# Patient Record
Sex: FEMALE | Race: WHITE | NOT HISPANIC OR LATINO | Employment: FULL TIME | URBAN - METROPOLITAN AREA
[De-identification: names, ages, dates, MRNs, and addresses within clinical notes are randomized per-mention and may not be internally consistent; named-entity substitution may affect disease eponyms.]

---

## 2023-07-14 PROBLEM — I10 HYPERTENSION: Status: ACTIVE | Noted: 2023-07-14

## 2023-07-14 PROBLEM — G43.909 MIGRAINES: Status: ACTIVE | Noted: 2023-07-14

## 2023-07-14 PROBLEM — E78.5 HYPERLIPIDEMIA: Status: ACTIVE | Noted: 2023-07-14

## 2023-07-14 PROBLEM — E66.01 MORBID OBESITY (MULTI): Status: ACTIVE | Noted: 2023-07-14

## 2023-07-14 RX ORDER — LOVASTATIN 40 MG/1
2 TABLET ORAL DAILY
COMMUNITY
Start: 2019-01-30 | End: 2023-08-29 | Stop reason: SDUPTHER

## 2023-07-14 RX ORDER — LISINOPRIL AND HYDROCHLOROTHIAZIDE 20; 25 MG/1; MG/1
1 TABLET ORAL DAILY
COMMUNITY
Start: 2019-11-13 | End: 2023-08-29 | Stop reason: SDUPTHER

## 2023-07-14 RX ORDER — ELETRIPTAN HYDROBROMIDE 40 MG/1
TABLET, FILM COATED ORAL
COMMUNITY
Start: 2019-01-30 | End: 2023-08-29 | Stop reason: SDUPTHER

## 2023-07-14 RX ORDER — AMLODIPINE BESYLATE 5 MG/1
1 TABLET ORAL DAILY
COMMUNITY
Start: 2019-01-30 | End: 2023-08-29 | Stop reason: SDUPTHER

## 2023-08-07 ENCOUNTER — OFFICE VISIT (OUTPATIENT)
Dept: PRIMARY CARE | Facility: CLINIC | Age: 58
End: 2023-08-07
Payer: COMMERCIAL

## 2023-08-07 VITALS
SYSTOLIC BLOOD PRESSURE: 130 MMHG | HEART RATE: 90 BPM | HEIGHT: 64 IN | OXYGEN SATURATION: 97 % | BODY MASS INDEX: 36.88 KG/M2 | WEIGHT: 216 LBS | DIASTOLIC BLOOD PRESSURE: 80 MMHG

## 2023-08-07 DIAGNOSIS — R35.0 URINE FREQUENCY: ICD-10-CM

## 2023-08-07 DIAGNOSIS — N39.0 URINARY TRACT INFECTION WITHOUT HEMATURIA, SITE UNSPECIFIED: Primary | ICD-10-CM

## 2023-08-07 LAB
POC APPEARANCE, URINE: ABNORMAL
POC BILIRUBIN, URINE: NEGATIVE
POC BLOOD, URINE: ABNORMAL
POC COLOR, URINE: ABNORMAL
POC GLUCOSE, URINE: ABNORMAL MG/DL
POC KETONES, URINE: NEGATIVE MG/DL
POC LEUKOCYTES, URINE: ABNORMAL
POC NITRITE,URINE: POSITIVE
POC PH, URINE: 7 PH
POC PROTEIN, URINE: NEGATIVE MG/DL
POC SPECIFIC GRAVITY, URINE: 1.01
POC UROBILINOGEN, URINE: 1 EU/DL

## 2023-08-07 PROCEDURE — 87077 CULTURE AEROBIC IDENTIFY: CPT

## 2023-08-07 PROCEDURE — 81003 URINALYSIS AUTO W/O SCOPE: CPT | Performed by: FAMILY MEDICINE

## 2023-08-07 PROCEDURE — 87186 SC STD MICRODIL/AGAR DIL: CPT

## 2023-08-07 PROCEDURE — 3075F SYST BP GE 130 - 139MM HG: CPT | Performed by: FAMILY MEDICINE

## 2023-08-07 PROCEDURE — 1036F TOBACCO NON-USER: CPT | Performed by: FAMILY MEDICINE

## 2023-08-07 PROCEDURE — 87086 URINE CULTURE/COLONY COUNT: CPT

## 2023-08-07 PROCEDURE — 3079F DIAST BP 80-89 MM HG: CPT | Performed by: FAMILY MEDICINE

## 2023-08-07 PROCEDURE — 99213 OFFICE O/P EST LOW 20 MIN: CPT | Performed by: FAMILY MEDICINE

## 2023-08-07 RX ORDER — SULFAMETHOXAZOLE AND TRIMETHOPRIM 800; 160 MG/1; MG/1
1 TABLET ORAL 2 TIMES DAILY
Qty: 20 TABLET | Refills: 0 | Status: SHIPPED | OUTPATIENT
Start: 2023-08-07 | End: 2023-08-07

## 2023-08-07 NOTE — PROGRESS NOTES
I saw and evaluated the patient. I personally obtained the key and critical portions of the history and physical exam or was physically present for key and critical portions performed by the nurse practitioner student. I reviewed the documentation and discussed the patient with the nurse practitioner student.  I was directly involved with the history, exam and medical decision making and agree with the medical decision making as documented in the note.      General Medical Management Note    57 y.o. female presents for possible UTI.  HPI    Patient presents with urinary urgency and frequency,lower back pain, low grade temperature.  Symptoms began 3 days ago.  Denies burning with urination. States she has had UTIs in the past, but not in the last 3-4 years.  Urine dipped and culture sent today.     Past Medical History:   Diagnosis Date    Body mass index (BMI) 39.0-39.9, adult 08/24/2021    BMI 39.0-39.9,adult      Past Surgical History:   Procedure Laterality Date    OTHER SURGICAL HISTORY  01/30/2019    Tonsillectomy     Family History   Problem Relation Name Age of Onset    Diabetes Mother      Hypertension Mother      Coronary artery disease Father      Diabetes Father      Hyperlipidemia Father      Diabetes Maternal Grandmother      Diabetes Maternal Grandfather      Diabetes Paternal Grandmother      Diabetes Paternal Grandfather        Social History     Socioeconomic History    Marital status:      Spouse name: Not on file    Number of children: Not on file    Years of education: Not on file    Highest education level: Not on file   Occupational History    Not on file   Tobacco Use    Smoking status: Never    Smokeless tobacco: Never   Substance and Sexual Activity    Alcohol use: Never    Drug use: Never    Sexual activity: Not on file   Other Topics Concern    Not on file   Social History Narrative    Not on file     Social Determinants of Health     Financial Resource Strain: Not on file   Food  "Insecurity: Not on file   Transportation Needs: Not on file   Physical Activity: Not on file   Stress: Not on file   Social Connections: Not on file   Intimate Partner Violence: Not on file   Housing Stability: Not on file       Current Outpatient Medications on File Prior to Visit   Medication Sig Dispense Refill    amLODIPine (Norvasc) 5 mg tablet Take 1 tablet (5 mg) by mouth once daily.      eletriptan (Relpax) 40 mg tablet Take by mouth.      lisinopriL-hydrochlorothiazide 20-25 mg tablet Take 1 tablet by mouth once daily.      lovastatin (Mevacor) 40 mg tablet Take 2 tablets (80 mg) by mouth once daily.       No current facility-administered medications on file prior to visit.       No Known Allergies      ROS: Denies chest pain, SOB, Headache, GI problems     Visit Vitals  /80   Pulse 90   Ht 1.619 m (5' 3.75\")   Wt 98 kg (216 lb)   SpO2 97%   BMI 37.37 kg/m²   Smoking Status Never   BSA 2.1 m²        PHYSICAL EXAM:  Alert and oriented x3.  Neck supple without lymph adenopathy or carotid bruit.  No masses or thyromegaly  Heart regular rate and rhythm without murmur.  Lungs clear to auscultation.    DIAGNOSIS/PLAN:    1. Urine frequency  - POCT UA Automated manually resulted  - Urine Culture    2. Urinary tract infection without hematuria, site unspecified  New order for Bactrim DS, PO, BID, x 10 days.    Teaching done to increase fluid intake and to set up MyChart, so that office can easily notify patient if antibiotic needs changed once culture is resulted.     Return to office on August 29, 2023 for comprehensive medical evaluation, long-term medication use monitoring, and preventative services screening    Will continue to monitor, evaluate, assess and treat all problems/diagnoses as appropriate and continue to collaborate with specialists.    Encouraged to sign up with  [employer]    Contact office or send a  [employer] message with any questions or concerns    Patient will only be notified of labs " that require medical intervention.    Prescriptions will not be filled unless you are compliant with your follow up appointments or have a follow up appointment scheduled as per instruction of your physician. Refills should be requested at the time of your visit.    **Charting was completed using voice recognition technology and may include unintended errors**    Jori Urrutia DO, FACOFP  Senior Attending Physician  [facility]  [id number]  [place] [address] [phone]         Jori Urrutia DO, FACOFP

## 2023-08-09 LAB — URINE CULTURE: ABNORMAL

## 2023-08-28 ASSESSMENT — PROMIS GLOBAL HEALTH SCALE
RATE_SOCIAL_SATISFACTION: EXCELLENT
RATE_GENERAL_HEALTH: VERY GOOD
RATE_PHYSICAL_HEALTH: VERY GOOD
RATE_QUALITY_OF_LIFE: EXCELLENT
RATE_AVERAGE_PAIN: 0
RATE_MENTAL_HEALTH: VERY GOOD
CARRYOUT_PHYSICAL_ACTIVITIES: COMPLETELY
EMOTIONAL_PROBLEMS: RARELY
CARRYOUT_SOCIAL_ACTIVITIES: EXCELLENT

## 2023-08-29 ENCOUNTER — OFFICE VISIT (OUTPATIENT)
Dept: PRIMARY CARE | Facility: CLINIC | Age: 58
End: 2023-08-29
Payer: COMMERCIAL

## 2023-08-29 VITALS
SYSTOLIC BLOOD PRESSURE: 124 MMHG | BODY MASS INDEX: 35.85 KG/M2 | HEIGHT: 64 IN | WEIGHT: 210 LBS | DIASTOLIC BLOOD PRESSURE: 70 MMHG

## 2023-08-29 DIAGNOSIS — Z12.31 ENCOUNTER FOR SCREENING MAMMOGRAM FOR MALIGNANT NEOPLASM OF BREAST: ICD-10-CM

## 2023-08-29 DIAGNOSIS — E66.01 MORBID OBESITY (MULTI): ICD-10-CM

## 2023-08-29 DIAGNOSIS — G43.119 INTRACTABLE MIGRAINE WITH AURA WITHOUT STATUS MIGRAINOSUS: ICD-10-CM

## 2023-08-29 DIAGNOSIS — I10 PRIMARY HYPERTENSION: ICD-10-CM

## 2023-08-29 DIAGNOSIS — Z00.00 WELLNESS EXAMINATION: Primary | ICD-10-CM

## 2023-08-29 DIAGNOSIS — E78.2 MIXED HYPERLIPIDEMIA: ICD-10-CM

## 2023-08-29 DIAGNOSIS — Z23 NEED FOR IMMUNIZATION AGAINST INFLUENZA: ICD-10-CM

## 2023-08-29 LAB
ALANINE AMINOTRANSFERASE (SGPT) (U/L) IN SER/PLAS: 17 U/L (ref 7–45)
ALBUMIN (G/DL) IN SER/PLAS: 4.7 G/DL (ref 3.4–5)
ALKALINE PHOSPHATASE (U/L) IN SER/PLAS: 71 U/L (ref 33–110)
ANION GAP IN SER/PLAS: 13 MMOL/L (ref 10–20)
ASPARTATE AMINOTRANSFERASE (SGOT) (U/L) IN SER/PLAS: 20 U/L (ref 9–39)
BILIRUBIN TOTAL (MG/DL) IN SER/PLAS: 0.5 MG/DL (ref 0–1.2)
CALCIUM (MG/DL) IN SER/PLAS: 9.9 MG/DL (ref 8.6–10.3)
CARBON DIOXIDE, TOTAL (MMOL/L) IN SER/PLAS: 27 MMOL/L (ref 21–32)
CHLORIDE (MMOL/L) IN SER/PLAS: 101 MMOL/L (ref 98–107)
CHOLESTEROL (MG/DL) IN SER/PLAS: 184 MG/DL (ref 0–199)
CHOLESTEROL IN HDL (MG/DL) IN SER/PLAS: 54.1 MG/DL
CHOLESTEROL/HDL RATIO: 3.4
CREATININE (MG/DL) IN SER/PLAS: 0.7 MG/DL (ref 0.5–1.05)
GFR FEMALE: >90 ML/MIN/1.73M2
GLUCOSE (MG/DL) IN SER/PLAS: 120 MG/DL (ref 74–99)
LDL: 113 MG/DL (ref 0–99)
POTASSIUM (MMOL/L) IN SER/PLAS: 4.1 MMOL/L (ref 3.5–5.3)
PROTEIN TOTAL: 7.5 G/DL (ref 6.4–8.2)
SODIUM (MMOL/L) IN SER/PLAS: 137 MMOL/L (ref 136–145)
TRIGLYCERIDE (MG/DL) IN SER/PLAS: 84 MG/DL (ref 0–149)
UREA NITROGEN (MG/DL) IN SER/PLAS: 17 MG/DL (ref 6–23)
VLDL: 17 MG/DL (ref 0–40)

## 2023-08-29 PROCEDURE — 90686 IIV4 VACC NO PRSV 0.5 ML IM: CPT | Performed by: FAMILY MEDICINE

## 2023-08-29 PROCEDURE — 3008F BODY MASS INDEX DOCD: CPT | Performed by: FAMILY MEDICINE

## 2023-08-29 PROCEDURE — 3078F DIAST BP <80 MM HG: CPT | Performed by: FAMILY MEDICINE

## 2023-08-29 PROCEDURE — 80053 COMPREHEN METABOLIC PANEL: CPT

## 2023-08-29 PROCEDURE — 3074F SYST BP LT 130 MM HG: CPT | Performed by: FAMILY MEDICINE

## 2023-08-29 PROCEDURE — 90471 IMMUNIZATION ADMIN: CPT | Performed by: FAMILY MEDICINE

## 2023-08-29 PROCEDURE — 1036F TOBACCO NON-USER: CPT | Performed by: FAMILY MEDICINE

## 2023-08-29 PROCEDURE — 80061 LIPID PANEL: CPT

## 2023-08-29 PROCEDURE — 99396 PREV VISIT EST AGE 40-64: CPT | Performed by: FAMILY MEDICINE

## 2023-08-29 RX ORDER — LOVASTATIN 40 MG/1
80 TABLET ORAL DAILY
Qty: 90 TABLET | Refills: 3 | Status: SHIPPED | OUTPATIENT
Start: 2023-08-29 | End: 2023-08-29

## 2023-08-29 RX ORDER — ELETRIPTAN HYDROBROMIDE 40 MG/1
20 TABLET, FILM COATED ORAL ONCE AS NEEDED
Qty: 6 TABLET | Refills: 3 | Status: SHIPPED | OUTPATIENT
Start: 2023-08-29 | End: 2023-08-29

## 2023-08-29 RX ORDER — CETIRIZINE HYDROCHLORIDE 10 MG/1
10 TABLET ORAL DAILY
COMMUNITY

## 2023-08-29 RX ORDER — AMLODIPINE BESYLATE 5 MG/1
5 TABLET ORAL DAILY
Qty: 90 TABLET | Refills: 3 | Status: SHIPPED | OUTPATIENT
Start: 2023-08-29 | End: 2023-08-29

## 2023-08-29 RX ORDER — LISINOPRIL AND HYDROCHLOROTHIAZIDE 20; 25 MG/1; MG/1
1 TABLET ORAL DAILY
Qty: 90 TABLET | Refills: 3 | Status: SHIPPED | OUTPATIENT
Start: 2023-08-29 | End: 2023-08-29

## 2023-08-29 ASSESSMENT — PATIENT HEALTH QUESTIONNAIRE - PHQ9
SUM OF ALL RESPONSES TO PHQ9 QUESTIONS 1 AND 2: 0
2. FEELING DOWN, DEPRESSED OR HOPELESS: NOT AT ALL
1. LITTLE INTEREST OR PLEASURE IN DOING THINGS: NOT AT ALL

## 2023-08-29 NOTE — ASSESSMENT & PLAN NOTE
- at goal  - continue weight loss efforts and regular exercise  - continue current medication therapy  - follow up 6 mo

## 2023-08-29 NOTE — ASSESSMENT & PLAN NOTE
Well woman visit  - continue healthy diet and exercise habits- work towards weight loss, applauded successes thus far  - Mammogram ordered today  - Last PAP/HPV testing 2020, due 2025 hx abnormal PAP in 1980s with cryotherapy, normal PAP testing since  - Cologuard 9/2021, wnl, due 9/2024  - Tdap 8/26/2022  - fasting labs ordered  - Influenza vaccination given in office  - follow up annually

## 2023-08-29 NOTE — PROGRESS NOTES
"Chief complaint:   Chief Complaint   Patient presents with    Annual Exam     CPE       HPI:  Debbi Brown is a 57 y.o. female who presents for a physical    Exercise: walk, bike, gym 4 x weekly  Is doing weight watchers- lost 20 lbs in the past few months  Caffeine: soda 1 daily  Tobacco: none  Drugs: none  Alcohol: 1 x monthly    ROS:  Constitutional:  Denies fevers, chills, night sweats  HEENT: Denies change in vision, change in hearing, sore throat, rhinorrhea, congestion  Cardiovascular: Denies chest pain, SOB, racing heart, slow heart rate, palpitations, leg edema  Pulmonary: Denies cough, wheezing, SOB  Gastrointestinal: Denies abdominal pain, diarrhea, constipation, nausea, vomiting, heartburn  Genitourinary: Admits to abnormal vaginal bleeding Denies dysuria, hematuria, incontinence, abnormal vaginal discharge, sexual dysfunction  Integumentary: Denies rash, new or changed skin lesions  Neuro: Denies headache, numbness, tingling  Musculoskeletal: Denies myalgias, arthralgias, back pain  Psych: denies change in mood, sleeping difficulties  Heme: denies bruising or bleeding     Physical exam:  /70   Ht 1.619 m (5' 3.75\")   Wt 95.3 kg (210 lb)   BMI 36.33 kg/m²   General: NAD, well appearing female  Head: normocephalic  Ears: EAC patent, TM normal bilaterally  Eyes: EOM intact, PERRLA  Nose: moist  Mouth: moist, good dentition  Heart: RRR, no murmur appreciated  Lungs: CTAB, no wheezes, rales, rhonchi  Abdomen: soft, non tender, no organomegaly  Psych: mood and affect congruent, alert and oriented  MSK: +5/5 gross strength  Neuro: +2/4 patellar and biceps reflexes, sensation grossly intact    Assessment/Plan   Problem List Items Addressed This Visit       Hyperlipidemia     - continue Lovastatin  - fasting labs ordered         Relevant Medications    lovastatin (Mevacor) 40 mg tablet    Hypertension     - at goal  - continue weight loss efforts and regular exercise  - continue current medication " therapy  - follow up 6 mo         Relevant Medications    lisinopriL-hydrochlorothiazide 20-25 mg tablet    amLODIPine (Norvasc) 5 mg tablet    Migraines     - refilled Relpax for PRN use (on average monthly)         Relevant Medications    eletriptan (Relpax) 40 mg tablet    Morbid obesity (CMS/HCC)    Wellness examination - Primary     Well woman visit  - continue healthy diet and exercise habits- work towards weight loss, applauded successes thus far  - Mammogram ordered today  - Last PAP/HPV testing 2020, due 2025 hx abnormal PAP in 1980s with cryotherapy, normal PAP testing since  - Cologuard 9/2021, wnl, due 9/2024  - Tdap 8/26/2022  - fasting labs ordered  - Influenza vaccination given in office  - follow up annually         Relevant Orders    Comprehensive Metabolic Panel    Lipid Panel     Other Visit Diagnoses       Encounter for screening mammogram for malignant neoplasm of breast        Relevant Orders    BI mammo bilateral screening tomosynthesis    Need for immunization against influenza        Relevant Orders    Flu vaccine (IIV4) age 3 years and greater, preservative free (Completed)    BMI 36.0-36.9,adult                Mckenna Santoyo, DO

## 2023-10-06 ENCOUNTER — TELEMEDICINE (OUTPATIENT)
Dept: PRIMARY CARE | Facility: CLINIC | Age: 58
End: 2023-10-06
Payer: COMMERCIAL

## 2023-10-06 ENCOUNTER — PHARMACY VISIT (OUTPATIENT)
Dept: PHARMACY | Facility: CLINIC | Age: 58
End: 2023-10-06
Payer: COMMERCIAL

## 2023-10-06 DIAGNOSIS — J01.90 ACUTE SINUSITIS, RECURRENCE NOT SPECIFIED, UNSPECIFIED LOCATION: Primary | ICD-10-CM

## 2023-10-06 PROCEDURE — 99213 OFFICE O/P EST LOW 20 MIN: CPT | Performed by: FAMILY MEDICINE

## 2023-10-06 PROCEDURE — RXMED WILLOW AMBULATORY MEDICATION CHARGE

## 2023-10-06 RX ORDER — AMOXICILLIN AND CLAVULANATE POTASSIUM 875; 125 MG/1; MG/1
875 TABLET, FILM COATED ORAL 2 TIMES DAILY
Qty: 20 TABLET | Refills: 0 | Status: SHIPPED | OUTPATIENT
Start: 2023-10-06 | End: 2023-10-16

## 2023-10-06 ASSESSMENT — ENCOUNTER SYMPTOMS
MYALGIAS: 0
HEMOPTYSIS: 0
RHINORRHEA: 1
CHILLS: 0
HEARTBURN: 0
SWEATS: 0
SHORTNESS OF BREATH: 0
FEVER: 0
COUGH: 1
HEADACHES: 1
SORE THROAT: 0
WHEEZING: 0
WEIGHT LOSS: 0

## 2023-10-06 NOTE — PROGRESS NOTES
Subjective   Patient ID: [id number]   Virtual or Telephone Consent    An interactive audio and video telecommunication system which permits real time communications between the patient (at the originating site) and provider (at the distant site) was utilized to provide this telehealth service.   Verbal consent was requested and obtained from Debbi Brown on this date, 10/06/23 for a telehealth visit.     Debbi Brown is a 57 y.o. female who presents for URI symptoms.  Cough  This is a recurrent problem. The current episode started in the past 7 days. The problem has been gradually worsening. The problem occurs every few minutes. The cough is Productive of sputum. Associated symptoms include headaches, nasal congestion, postnasal drip and rhinorrhea. Pertinent negatives include no chest pain, chills, ear congestion, ear pain, fever, heartburn, hemoptysis, myalgias, rash, sore throat, shortness of breath, sweats, weight loss or wheezing. The symptoms are aggravated by lying down.     Has had postnasal drip for about a month.  Has increased in thickness and had more congestion in the last few days.  She takes zyrtec daily.  No sore throat, fever or SOB.      No nausea or diarrhea.      Objective     There were no vitals taken for this visit.     Physical Exam  Constitutional:       General: She is not in acute distress.     Appearance: She is not ill-appearing or toxic-appearing.   Pulmonary:      Effort: Pulmonary effort is normal. No respiratory distress.   Neurological:      Mental Status: She is alert.         Assessment/Plan   Problem List Items Addressed This Visit    None  Visit Diagnoses       Acute sinusitis, recurrence not specified, unspecified location    -  Primary    Relevant Medications    amoxicillin-pot clavulanate (Augmentin) 875-125 mg tablet          I will prescribe antibiotics.  I recommend that you get a covid test and quarantine for five days if it is positive.  Return in one or two weeks  if this persists.    Frank Woo, DO

## 2023-10-06 NOTE — PATIENT INSTRUCTIONS
I will prescribe antibiotics.  I recommend that you get a covid test and quarantine for five days if it is positive.  Return in one or two weeks if this persists.

## 2023-11-07 ENCOUNTER — PHARMACY VISIT (OUTPATIENT)
Dept: PHARMACY | Facility: CLINIC | Age: 58
End: 2023-11-07
Payer: COMMERCIAL

## 2023-11-07 PROCEDURE — RXMED WILLOW AMBULATORY MEDICATION CHARGE

## 2023-11-28 ENCOUNTER — PHARMACY VISIT (OUTPATIENT)
Dept: PHARMACY | Facility: CLINIC | Age: 58
End: 2023-11-28
Payer: COMMERCIAL

## 2023-11-28 ENCOUNTER — OFFICE VISIT (OUTPATIENT)
Dept: PRIMARY CARE | Facility: CLINIC | Age: 58
End: 2023-11-28
Payer: COMMERCIAL

## 2023-11-28 VITALS
HEIGHT: 64 IN | WEIGHT: 204 LBS | BODY MASS INDEX: 34.83 KG/M2 | DIASTOLIC BLOOD PRESSURE: 78 MMHG | SYSTOLIC BLOOD PRESSURE: 124 MMHG

## 2023-11-28 DIAGNOSIS — E66.01 MORBID OBESITY (MULTI): ICD-10-CM

## 2023-11-28 DIAGNOSIS — G43.119 INTRACTABLE MIGRAINE WITH AURA WITHOUT STATUS MIGRAINOSUS: ICD-10-CM

## 2023-11-28 DIAGNOSIS — I10 PRIMARY HYPERTENSION: Primary | ICD-10-CM

## 2023-11-28 DIAGNOSIS — R73.9 HYPERGLYCEMIA: ICD-10-CM

## 2023-11-28 DIAGNOSIS — E78.2 MIXED HYPERLIPIDEMIA: ICD-10-CM

## 2023-11-28 LAB
POC FINGERSTICK BLOOD GLUCOSE: 122 MG/DL (ref 70–100)
POC HEMOGLOBIN A1C: 5.9 % (ref 4.2–6.5)

## 2023-11-28 PROCEDURE — RXMED WILLOW AMBULATORY MEDICATION CHARGE

## 2023-11-28 PROCEDURE — 3008F BODY MASS INDEX DOCD: CPT | Performed by: FAMILY MEDICINE

## 2023-11-28 PROCEDURE — 82962 GLUCOSE BLOOD TEST: CPT | Performed by: FAMILY MEDICINE

## 2023-11-28 PROCEDURE — 3074F SYST BP LT 130 MM HG: CPT | Performed by: FAMILY MEDICINE

## 2023-11-28 PROCEDURE — 99214 OFFICE O/P EST MOD 30 MIN: CPT | Performed by: FAMILY MEDICINE

## 2023-11-28 PROCEDURE — 1036F TOBACCO NON-USER: CPT | Performed by: FAMILY MEDICINE

## 2023-11-28 PROCEDURE — 83036 HEMOGLOBIN GLYCOSYLATED A1C: CPT | Performed by: FAMILY MEDICINE

## 2023-11-28 PROCEDURE — 3078F DIAST BP <80 MM HG: CPT | Performed by: FAMILY MEDICINE

## 2023-11-28 RX ORDER — LISINOPRIL AND HYDROCHLOROTHIAZIDE 20; 25 MG/1; MG/1
1 TABLET ORAL DAILY
Qty: 90 TABLET | Refills: 3 | Status: SHIPPED | OUTPATIENT
Start: 2023-11-28 | End: 2024-11-27

## 2023-11-28 RX ORDER — ELETRIPTAN HYDROBROMIDE 40 MG/1
TABLET, FILM COATED ORAL
Qty: 6 TABLET | Refills: 3 | Status: SHIPPED | OUTPATIENT
Start: 2023-11-28 | End: 2024-11-27

## 2023-11-28 RX ORDER — AMLODIPINE BESYLATE 5 MG/1
5 TABLET ORAL
Qty: 90 TABLET | Refills: 3 | Status: SHIPPED | OUTPATIENT
Start: 2023-11-28 | End: 2024-11-27

## 2023-11-28 RX ORDER — LOVASTATIN 40 MG/1
80 TABLET ORAL DAILY
Qty: 90 TABLET | Refills: 3 | Status: SHIPPED | OUTPATIENT
Start: 2023-11-28 | End: 2024-11-27

## 2023-11-28 ASSESSMENT — PATIENT HEALTH QUESTIONNAIRE - PHQ9
2. FEELING DOWN, DEPRESSED OR HOPELESS: NOT AT ALL
1. LITTLE INTEREST OR PLEASURE IN DOING THINGS: NOT AT ALL
SUM OF ALL RESPONSES TO PHQ9 QUESTIONS 1 AND 2: 0

## 2023-11-28 NOTE — PROGRESS NOTES
"Chief complaint:   Chief Complaint   Patient presents with    Follow-up     3 month follow up       HPI:  Debbi Brown is a 57 y.o. female who presents for evaluation of elevated glucose. She has lost 25 lbs in the past 6 mo or so.    Physical exam:  /78   Ht 1.619 m (5' 3.75\")   Wt 92.5 kg (204 lb)   BMI 35.29 kg/m²   General: NAD, well appearing female  Heart: RRR, no mumur appreciated  Lungs: CTAB, no wheezes, rales, rhonchi  Abdomen: soft, non tender, normoactive BS, no organomegaly  Extremities: No LE edema    Assessment/Plan   Problem List Items Addressed This Visit       Hyperlipidemia     - continue Lovastatin  - last lipid: 8/2023         Relevant Medications    lovastatin (Mevacor) 40 mg tablet    Hypertension - Primary     - at goal  - continue weight loss efforts and regular exercise  - continue current medication therapy  - follow up 6 mo         Relevant Medications    lisinopriL-hydrochlorothiazide 20-25 mg tablet    amLODIPine (Norvasc) 5 mg tablet    Migraines    Relevant Medications    eletriptan (Relpax) 40 mg tablet    Morbid obesity (CMS/HCC)     - applauded weight loss successes and encouraged continued weight loss through healthy diet and exercise         Hyperglycemia     - IO glucose 122  - IO Hba1C 5.9%  - continue weight loss efforts  - follow up 3 mo         Relevant Orders    POCT Fingerstick Glucose manually resulted (Completed)    POCT glycosylated hemoglobin (Hb A1C) manually resulted (Completed)     Other Visit Diagnoses       BMI 35.0-35.9,adult                Mckenna Santoyo, DO        "

## 2023-11-28 NOTE — ASSESSMENT & PLAN NOTE
- applauded weight loss successes and encouraged continued weight loss through healthy diet and exercise

## 2024-02-02 PROCEDURE — RXMED WILLOW AMBULATORY MEDICATION CHARGE

## 2024-02-06 ENCOUNTER — PHARMACY VISIT (OUTPATIENT)
Dept: PHARMACY | Facility: CLINIC | Age: 59
End: 2024-02-06
Payer: COMMERCIAL

## 2024-02-27 ENCOUNTER — OFFICE VISIT (OUTPATIENT)
Dept: PRIMARY CARE | Facility: CLINIC | Age: 59
End: 2024-02-27
Payer: COMMERCIAL

## 2024-02-27 VITALS
BODY MASS INDEX: 35.47 KG/M2 | WEIGHT: 205.03 LBS | DIASTOLIC BLOOD PRESSURE: 76 MMHG | TEMPERATURE: 96.9 F | SYSTOLIC BLOOD PRESSURE: 124 MMHG

## 2024-02-27 DIAGNOSIS — R73.9 HYPERGLYCEMIA: ICD-10-CM

## 2024-02-27 DIAGNOSIS — E66.01 MORBID OBESITY (MULTI): ICD-10-CM

## 2024-02-27 DIAGNOSIS — Z12.11 SCREENING FOR COLON CANCER: ICD-10-CM

## 2024-02-27 DIAGNOSIS — G43.109 MIGRAINE WITH AURA AND WITHOUT STATUS MIGRAINOSUS, NOT INTRACTABLE: Primary | ICD-10-CM

## 2024-02-27 DIAGNOSIS — E78.2 MIXED HYPERLIPIDEMIA: ICD-10-CM

## 2024-02-27 DIAGNOSIS — I10 PRIMARY HYPERTENSION: ICD-10-CM

## 2024-02-27 LAB
POC FINGERSTICK BLOOD GLUCOSE: 138 MG/DL (ref 70–100)
POC HEMOGLOBIN A1C: 6.2 % (ref 4.2–6.5)

## 2024-02-27 PROCEDURE — 3078F DIAST BP <80 MM HG: CPT | Performed by: FAMILY MEDICINE

## 2024-02-27 PROCEDURE — 3008F BODY MASS INDEX DOCD: CPT | Performed by: FAMILY MEDICINE

## 2024-02-27 PROCEDURE — 99213 OFFICE O/P EST LOW 20 MIN: CPT | Performed by: FAMILY MEDICINE

## 2024-02-27 PROCEDURE — 83036 HEMOGLOBIN GLYCOSYLATED A1C: CPT | Performed by: FAMILY MEDICINE

## 2024-02-27 PROCEDURE — 3074F SYST BP LT 130 MM HG: CPT | Performed by: FAMILY MEDICINE

## 2024-02-27 PROCEDURE — 1036F TOBACCO NON-USER: CPT | Performed by: FAMILY MEDICINE

## 2024-02-27 PROCEDURE — 82962 GLUCOSE BLOOD TEST: CPT | Performed by: FAMILY MEDICINE

## 2024-02-27 NOTE — ASSESSMENT & PLAN NOTE
- IO glucose 138  - IO Hba1C 6.2% which has increased from 5.9% last visit  - continue weight loss efforts  - dietician referral offered and placed  - follow up 3 mo

## 2024-02-27 NOTE — PROGRESS NOTES
Chief complaint:   Chief Complaint   Patient presents with    Follow-up     6 month follow up       HPI:  Debbi Brown is a 58 y.o. female who presents for evaluation of her blood pressure and blood sugar. She has been working on improving diet and exercise.    Exercise: gym, walk, 10,000-12,000 steps daily  Alcohol: less then weekly    Physical exam:  /76   Temp 36.1 °C (96.9 °F)   Wt 93 kg (205 lb 0.4 oz)   BMI 35.47 kg/m²   General: NAD, well appearing female  Heart: RRR, no mumur appreciated  Lungs: CTAB, no wheezes, rales, rhonchi  Abdomen: soft, non tender, normoactive BS, no organomegaly  Extremities: No LE edema    Assessment/Plan   Problem List Items Addressed This Visit       Hyperlipidemia     - continue Lovastatin  - last lipid: 8/2023         Hypertension     - at goal  - continue weight loss efforts and regular exercise  - continue current medication therapy  - follow up 6 mo         Migraines - Primary     - continue Relpax for PRN use (on average monthly)         Morbid obesity (CMS/HCC)     - encourage continued weight loss through healthy diet and exercise         Relevant Orders    Referral to Nutrition Services    Hyperglycemia     - IO glucose 138  - IO Hba1C 6.2% which has increased from 5.9% last visit  - continue weight loss efforts  - dietician referral offered and placed  - follow up 3 mo         Relevant Orders    POCT glycosylated hemoglobin (Hb A1C) manually resulted (Completed)    POCT Fingerstick Glucose manually resulted (Completed)    Referral to Nutrition Services     Other Visit Diagnoses       Screening for colon cancer        Relevant Orders    Colonoscopy Screening; Average Risk Patient            Mckenna Santoyo DO

## 2024-03-25 ENCOUNTER — NUTRITION (OUTPATIENT)
Dept: NUTRITION | Facility: CLINIC | Age: 59
End: 2024-03-25
Payer: COMMERCIAL

## 2024-03-25 DIAGNOSIS — E66.01 MORBID OBESITY (MULTI): ICD-10-CM

## 2024-03-25 DIAGNOSIS — Z71.3 DIETARY COUNSELING: Primary | ICD-10-CM

## 2024-03-25 DIAGNOSIS — R73.9 HYPERGLYCEMIA: ICD-10-CM

## 2024-03-25 PROCEDURE — 97802 MEDICAL NUTRITION INDIV IN: CPT | Performed by: DIETITIAN, REGISTERED

## 2024-03-25 NOTE — PROGRESS NOTES
Reason for Nutrition Visit:  Pt is a 58 y.o. female being seen for initial apt at [facility] referred for   1. Hyperglycemia  Referral to Nutrition Services      2. Morbid obesity (CMS/HCC)  Referral to Nutrition Services         Past Medical Hx:  Patient Active Problem List   Diagnosis    Hyperlipidemia    Hypertension    Migraines    Morbid obesity (CMS/HCC)    Wellness examination    Hyperglycemia      Lab Results   Component Value Date    HGBA1C 6.2 02/27/2024    CHOL 184 08/29/2023    TRIG 84 08/29/2023    HDL 54.1 08/29/2023      Food and Nutrition Hx:  Pt says her HgA1c has been elevated and actually increased in the last few months, despite losing weight intentionally. She mentions a family hx of DM. She started WW in June, but hit a plateau a few weeks ago, however, she has lost some weight since then. She also works out 5-6 days per week and gets almost 12,000 steps daily. She's been trying to avoid snacking at night or might have pickles if she has a salty craving.    24 Diet Recall:  Meal 1: 1-2 scrambled eggs and fruit  Meal 2: chicken quasidellia with low carb tortillas  Meal 3: food truck--lobster tacos  Beverages: 16-24 oz water, 1 can of coke zero, rarely etoh, lemonade made with stevia occasionally    Allergies: None  Intolerance: None  Appetite: Good  Intake: >75%  GI Symptoms : None   Swallowing Difficulty: No problems with swallowing  Dentition : own    Types of Activities: Gym Membership and does resistance machines, rowing and walks her dog or hits about 12,000 steps daily  Duration: 30-60 minutes and 1-2 hours  4-5 times per week    Sleep duration/quality : 5-6 hours, 7+ hours, and disrupted sleep  Sleep disorders: difficulty falling asleep    Supplements: Probiotic and Biotin and Emergen-C  daily    Feelings of Hunger?: Yes and will eat  Physical Feeling: Physically full  Binging: Never  Cravings: Salty  Energy Levels: Stable    Food Preparation: Patient  Cooking Skills/Barriers: None  reported  Grocery Shopping: Patient    Eating Out Type: Dinner and Restaurant  <1 times per week  Convenience Foods: Frozen Meals  rarely at work    Nutrition Focused Physical Exam:    Performed/Deferred: Deferred as pt visually appears well-nourished with no signs of malnutrition    Muscle Wasting:  Temporal: None  Shoulder: None  None  Interosseous Muscle: None  Quadriceps: None    Loss of Subcutaneous Fat:  Eyes: None  Perioral: None  Triceps: None  Chest: None    Other Physical Findings:  Hair: None  None  Mouth: None  Skin: None  Nails: None  none    Malnutrition Present: No    Estimated Energy Needs:    Weight Loss Needs: 1807 kcal/day, MSJ: 1506-1.2, 93 g/pro/day, and 1 g/pro/kg/day    Nutrition Diagnosis:    Diagnosis Statement 1:  Diagnosis Status: New  Diagnosis : Altered nutrition related lab values  related to  endocrine dysfunction  as evidenced by  HgA1c of 6.2% on 02/27/2024.     Diagnosis Statement 2:  Diagnosis Status: New  Diagnosis : Inadequate fiber intake  related to food and nutrition related knowledge deficit concerning desirable quantities of fiber as evidenced by estimated intake of fiber that is insufficient when compared to recommended amounts (38 g/day for men and 25 g/day for women)    Diagnosis Statement 3:   Diagnosis Status: New  Diagnosis : Food and nutrition related knowledge deficit related to lack of or limited prior nutrition-related education as evidenced by no prior knowledge of need for food- and nutrition-related recommendations    Nutrition Interventions:  Anti-Inflammatory Diet, Decreased Sodium Diet, Increased Omega-3 Diet, Increased Protein Diet, Increased Vitamin D Intake, Low Saturated Fat Diet, and Mediterranean Diet  JenMonitoring&Evaluation: Estimated Energy Intake, Amount of Food - Estimated, Meal/Snack Pattern - Estimated, Estimated Protein Intake, Food and Nutrition Knowledge, and Food and Nutrition Skill  Nutrition Counseling: Motivational Interviewing and Goal  Setting  Coordination of Care: None    Nutrition Goals:  Nutrition Goals : Adequate fluid intake: 64 oz daily  Consistent meal/snack pattern  Decrease intake of added sugars  Decrease intake of saturated fats  Decrease sodium intake  Increase awareness and respond to hunger cues  Increase awareness and respond to satiety cues  Reduce Hgb A1c  Reduce Kcal Intake  Weight Loss    Nutrition Recommendations:  Via teach back method patient verbalized understanding of the following topics:  1) Make a plate that is 1/2 filled with non-starchy vegetables (any vegetable other than potatoes, peas or corn), 1/4 filled with whole grain/starch and 1/4 lean protein. This will help increase fiber intake and keep you full after eating.  2) Suggested low salt snacks like edamame with coconut aminos, air popped popcorn with nutritional yeast, or roasted chickpeas for snacking.  3) Recommended including healthy fats like amanda seeds, flax seeds, pumpkin seeds, nuts, avocado, olive oil, olives or vinagriette dressing more regularly.  4) For further blood sugar management, consider doing cardio first and then doing resistance/strength training after. Choose to walk your dog after eating to reduce blood sugar rise.  5) Discussed benefits of doing self monitoring of blood sugar at home to determine what times of day there are spikes. Consider drinking a tablespoon of ACV or including the supplement berbrine to possibly reduce insulin resistance.   6) Consider using a soluble fiber supplement such as Metamucil. It can help with reduce appetite in the evening.    Educational Handouts: ADA Placemat, How to Use a Soluble Fiber Supplement, and ACLM Snacks    Readiness to Change : Fair  Level of Understanding : Fair  Anticipated Compliant : Fair

## 2024-03-25 NOTE — PATIENT INSTRUCTIONS
1) Consider following a Mediterranean Diet approach to help improve health and reduce risk/manage chronic disease. Focus on eating more unprocessed foods including fruits, vegetables, whole grains, legumes, nuts, fish, poultry, eggs, low fat cheese and dairy (cottage cheese, greek yogurt, and kefir). Include variety and color in your diet with fresh or frozen varieties of fruits and vegetables regularly included at your meals.  2) Decrease intake of processed foods with added fats, sugar, and salt. Reduce salt or sodium intake to less than 1500mg of sodium per day. Instead of adding table salt for flavor, use more herbs (basil, thyme, mint, parsley, cilantro, and dill), seasonings (cinnamon, cloves, oregano, fennel seed, and chili powder) and other flavors (roc, garlic, and turmeric) when cooking. Use vegetable oil such as olive, canola, safflower, soybean and corn instead of butter, lard, or whole-fat dairy sources when cooking.  3) Eat less red meat including lean beef, pork or lamb to only a few times per month. Lean poultry including skinless chicken and turkey can be eaten a few times per week with a serving being 2-3 ounces. Focus on plant based protein sources such as nuts (almonds, walnuts, and pistachios), seeds (sunflower, flax, amanda), legumes (navy beans, kidney beans, black beans, chickpeas, and lentils), tofu, tempeh, and soy products.  4) Eat more fresh or canned fish including shellfish, salmon, tuna, sardines and herring several times a week. These contain beneficial omega-3 fatty acids and have a low saturated fat content.  5) Choose more whole grains for their added fiber content. Whole grains include 100% whole wheat bread products, brown rice, popcorn, oatmeal, quinoa, and couscous. Try to eat fruit for something sweet such as fruit parfait made with unsweetened greek yogurt and homemade granola or low sugar fruit desserts.  6) Include physical activity into your day with a goal to meet 150  minutes of moderate-intensity aerobic activity (walking, biking, swimming, or housework) every week. Strength-training or weight-bearing exercise should be included twice a week if you are physically able to.

## 2024-03-27 PROCEDURE — RXMED WILLOW AMBULATORY MEDICATION CHARGE

## 2024-04-01 ENCOUNTER — PHARMACY VISIT (OUTPATIENT)
Dept: PHARMACY | Facility: CLINIC | Age: 59
End: 2024-04-01
Payer: COMMERCIAL

## 2024-05-02 PROCEDURE — RXMED WILLOW AMBULATORY MEDICATION CHARGE

## 2024-05-07 ENCOUNTER — PHARMACY VISIT (OUTPATIENT)
Dept: PHARMACY | Facility: CLINIC | Age: 59
End: 2024-05-07
Payer: COMMERCIAL

## 2024-05-28 ENCOUNTER — APPOINTMENT (OUTPATIENT)
Dept: PRIMARY CARE | Facility: CLINIC | Age: 59
End: 2024-05-28
Payer: COMMERCIAL

## 2024-06-04 ENCOUNTER — OFFICE VISIT (OUTPATIENT)
Dept: PRIMARY CARE | Facility: CLINIC | Age: 59
End: 2024-06-04
Payer: COMMERCIAL

## 2024-06-04 ENCOUNTER — PHARMACY VISIT (OUTPATIENT)
Dept: PHARMACY | Facility: CLINIC | Age: 59
End: 2024-06-04
Payer: COMMERCIAL

## 2024-06-04 VITALS
TEMPERATURE: 98.1 F | WEIGHT: 200.8 LBS | BODY MASS INDEX: 34.74 KG/M2 | SYSTOLIC BLOOD PRESSURE: 128 MMHG | DIASTOLIC BLOOD PRESSURE: 60 MMHG

## 2024-06-04 DIAGNOSIS — R80.9 TYPE 2 DIABETES MELLITUS WITH PROTEINURIA (MULTI): ICD-10-CM

## 2024-06-04 DIAGNOSIS — E78.2 MIXED HYPERLIPIDEMIA: ICD-10-CM

## 2024-06-04 DIAGNOSIS — E11.9 TYPE 2 DIABETES MELLITUS WITHOUT COMPLICATION, WITHOUT LONG-TERM CURRENT USE OF INSULIN (MULTI): Primary | ICD-10-CM

## 2024-06-04 DIAGNOSIS — Z12.31 ENCOUNTER FOR SCREENING MAMMOGRAM FOR MALIGNANT NEOPLASM OF BREAST: ICD-10-CM

## 2024-06-04 DIAGNOSIS — E11.29 TYPE 2 DIABETES MELLITUS WITH PROTEINURIA (MULTI): ICD-10-CM

## 2024-06-04 DIAGNOSIS — R73.9 HYPERGLYCEMIA: ICD-10-CM

## 2024-06-04 DIAGNOSIS — Z23 NEED FOR VACCINATION: ICD-10-CM

## 2024-06-04 DIAGNOSIS — Z12.11 COLON CANCER SCREENING: ICD-10-CM

## 2024-06-04 LAB
POC ALBUMIN /CREATININE RATIO MANUALLY ENTERED: ABNORMAL UG/MG CREAT
POC HDL CHOLESTEROL: 48 MG/DL (ref 0–50)
POC HEMOGLOBIN A1C: 7.1 % (ref 4.2–6.5)
POC LDL CHOLESTEROL: 102 MG/DL (ref 0–100)
POC NON-HDL CHOLESTEROL: 116 MG/DL (ref 0–130)
POC TOTAL CHOLESTEROL/HDL RATIO: 3.4 (ref 0–4.5)
POC TOTAL CHOLESTEROL: 164 MG/DL (ref 0–199)
POC TRIGLYCERIDES: 65 MG/DL (ref 0–150)
POC URINE ALBUMIN: 10 MG/L
POC URINE CREATININE: 50 MG/DL

## 2024-06-04 PROCEDURE — 82044 UR ALBUMIN SEMIQUANTITATIVE: CPT | Performed by: FAMILY MEDICINE

## 2024-06-04 PROCEDURE — 90677 PCV20 VACCINE IM: CPT | Performed by: FAMILY MEDICINE

## 2024-06-04 PROCEDURE — 83036 HEMOGLOBIN GLYCOSYLATED A1C: CPT | Performed by: FAMILY MEDICINE

## 2024-06-04 PROCEDURE — 99214 OFFICE O/P EST MOD 30 MIN: CPT | Performed by: FAMILY MEDICINE

## 2024-06-04 PROCEDURE — 1036F TOBACCO NON-USER: CPT | Performed by: FAMILY MEDICINE

## 2024-06-04 PROCEDURE — 90471 IMMUNIZATION ADMIN: CPT | Performed by: FAMILY MEDICINE

## 2024-06-04 PROCEDURE — 3078F DIAST BP <80 MM HG: CPT | Performed by: FAMILY MEDICINE

## 2024-06-04 PROCEDURE — 3008F BODY MASS INDEX DOCD: CPT | Performed by: FAMILY MEDICINE

## 2024-06-04 PROCEDURE — 80061 LIPID PANEL: CPT | Performed by: FAMILY MEDICINE

## 2024-06-04 PROCEDURE — RXMED WILLOW AMBULATORY MEDICATION CHARGE

## 2024-06-04 PROCEDURE — 3074F SYST BP LT 130 MM HG: CPT | Performed by: FAMILY MEDICINE

## 2024-06-04 ASSESSMENT — PATIENT HEALTH QUESTIONNAIRE - PHQ9
SUM OF ALL RESPONSES TO PHQ9 QUESTIONS 1 AND 2: 0
1. LITTLE INTEREST OR PLEASURE IN DOING THINGS: NOT AT ALL
2. FEELING DOWN, DEPRESSED OR HOPELESS: NOT AT ALL

## 2024-06-04 NOTE — ASSESSMENT & PLAN NOTE
- new dx 6/4/2024  - IO glucose 138, 2/27/2024  - IO Hba1C 7.1 6/4/2024 which has increased, Jardiance 10 mg PO daily initiated  - IO urine microalbumin abnormal, Jardiance 10 mg PO daily initiated  - IO lipids reviewed, continue Lovastatin 40 mg PO daily  - continue weight loss efforts  - dietician referral offered and placed  - Prevnar 20 given in office 6/4/2024  - follow up 3 mo for HBA1C, urine microalbumin

## 2024-06-04 NOTE — PROGRESS NOTES
Chief complaint:   Chief Complaint   Patient presents with    Follow-up     3 month       HPI:  Debbi Brown is a 58 y.o. female who presents for evaluation of her diabetes. She has been working hard on weight loss and is down 30 lbs. She is continuing to work on further weight loss.     Physical exam:  /60 (BP Location: Right arm, Patient Position: Sitting)   Temp 36.7 °C (98.1 °F)   Wt 91.1 kg (200 lb 12.8 oz)   BMI 34.74 kg/m²   General: NAD, well appearing female  Heart: RRR, no mumur appreciated  Lungs: CTAB, no wheezes, rales, rhonchi  Abdomen: soft, non tender, normoactive BS, no organomegaly  Extremities: No LE edema    Assessment/Plan   Problem List Items Addressed This Visit       Hyperlipidemia    Relevant Orders    POCT Lipid Panel manually resulted (Completed)    Type 2 diabetes mellitus with proteinuria (Multi) - Primary     - new dx 6/4/2024  - IO glucose 138, 2/27/2024  - IO Hba1C 7.1 6/4/2024 which has increased, Jardiance 10 mg PO daily initiated  - IO urine microalbumin abnormal, Jardiance 10 mg PO daily initiated  - IO lipids reviewed, continue Lovastatin 40 mg PO daily  - continue weight loss efforts  - dietician referral offered and placed  - Prevnar 20 given in office 6/4/2024  - follow up 3 mo for HBA1C, urine microalbumin         Relevant Medications    empagliflozin (Jardiance) 10 mg     Other Visit Diagnoses       Colon cancer screening        Relevant Orders    Colonoscopy Screening; Average Risk Patient    Encounter for screening mammogram for malignant neoplasm of breast        Relevant Orders    BI mammo bilateral screening tomosynthesis    Need for vaccination        Relevant Orders    Pneumococcal conjugate vaccine, 20-valent (PREVNAR 20) (Completed)        Mammogram and colonoscopy ordered  Prevnar 20 given for new dx of diabetes  Jardiance 10 mg PO daily initiated for DM II and proteinuria   Follow up 3 mo for IO HbA1C and urine microalbumin    Mckenna Santoyo,  DO

## 2024-07-19 DIAGNOSIS — G43.119 INTRACTABLE MIGRAINE WITH AURA WITHOUT STATUS MIGRAINOSUS: ICD-10-CM

## 2024-07-19 PROCEDURE — RXMED WILLOW AMBULATORY MEDICATION CHARGE

## 2024-07-19 RX ORDER — ELETRIPTAN HYDROBROMIDE 40 MG/1
TABLET, FILM COATED ORAL
Qty: 6 TABLET | Refills: 3 | OUTPATIENT
Start: 2024-07-19 | End: 2025-07-19

## 2024-07-23 ENCOUNTER — TELEPHONE (OUTPATIENT)
Dept: PRIMARY CARE | Facility: CLINIC | Age: 59
End: 2024-07-23
Payer: COMMERCIAL

## 2024-07-23 ENCOUNTER — PHARMACY VISIT (OUTPATIENT)
Dept: PHARMACY | Facility: CLINIC | Age: 59
End: 2024-07-23
Payer: COMMERCIAL

## 2024-07-23 DIAGNOSIS — G43.119 INTRACTABLE MIGRAINE WITH AURA WITHOUT STATUS MIGRAINOSUS: ICD-10-CM

## 2024-07-23 PROCEDURE — RXMED WILLOW AMBULATORY MEDICATION CHARGE

## 2024-07-23 RX ORDER — ELETRIPTAN HYDROBROMIDE 40 MG/1
TABLET, FILM COATED ORAL
Qty: 6 TABLET | Refills: 2 | Status: SHIPPED | OUTPATIENT
Start: 2024-07-23 | End: 2025-07-23

## 2024-07-23 NOTE — TELEPHONE ENCOUNTER
REFILL  MEDICATION:     Eletriptan 40 MG; Take 0.5 tablets 1 time if needed for migraine.    PHARM: [facility]   PHARM NUMBER: [phone]     LR: 11/28/23      6 tablets with 3 refills   LV: 6/4/24  NV: 9/11/24

## 2024-07-24 ENCOUNTER — PHARMACY VISIT (OUTPATIENT)
Dept: PHARMACY | Facility: CLINIC | Age: 59
End: 2024-07-24
Payer: COMMERCIAL

## 2024-08-13 DIAGNOSIS — E78.2 MIXED HYPERLIPIDEMIA: ICD-10-CM

## 2024-08-14 RX ORDER — LOVASTATIN 40 MG/1
80 TABLET ORAL DAILY
Qty: 90 TABLET | Refills: 3 | OUTPATIENT
Start: 2024-08-14 | End: 2025-08-14

## 2024-08-19 ENCOUNTER — TELEPHONE (OUTPATIENT)
Dept: PRIMARY CARE | Facility: CLINIC | Age: 59
End: 2024-08-19
Payer: COMMERCIAL

## 2024-08-19 DIAGNOSIS — E78.2 MIXED HYPERLIPIDEMIA: ICD-10-CM

## 2024-08-19 PROCEDURE — RXMED WILLOW AMBULATORY MEDICATION CHARGE

## 2024-08-19 RX ORDER — LOVASTATIN 40 MG/1
80 TABLET ORAL DAILY
Qty: 90 TABLET | Refills: 3 | OUTPATIENT
Start: 2024-08-19 | End: 2025-08-19

## 2024-08-19 RX ORDER — LOVASTATIN 40 MG/1
80 TABLET ORAL DAILY
Qty: 90 TABLET | Refills: 3 | Status: CANCELLED | OUTPATIENT
Start: 2024-08-19 | End: 2025-08-19

## 2024-08-19 NOTE — TELEPHONE ENCOUNTER
Pt is calling requesting a refill on    Lovastatin 40 mg 1 po bid  LR 11/23/2023  # [phone] RF    [facility]  [phone]     6/04/24  NV 9/11/2024

## 2024-08-20 RX ORDER — LOVASTATIN 40 MG/1
80 TABLET ORAL DAILY
Qty: 90 TABLET | Refills: 0 | Status: SHIPPED | OUTPATIENT
Start: 2024-08-20 | End: 2025-08-20

## 2024-08-23 ENCOUNTER — PHARMACY VISIT (OUTPATIENT)
Dept: PHARMACY | Facility: CLINIC | Age: 59
End: 2024-08-23
Payer: COMMERCIAL

## 2024-08-23 PROCEDURE — RXMED WILLOW AMBULATORY MEDICATION CHARGE

## 2024-09-03 ENCOUNTER — APPOINTMENT (OUTPATIENT)
Dept: PRIMARY CARE | Facility: CLINIC | Age: 59
End: 2024-09-03
Payer: COMMERCIAL

## 2024-09-03 ENCOUNTER — TELEPHONE (OUTPATIENT)
Dept: PRIMARY CARE | Facility: CLINIC | Age: 59
End: 2024-09-03

## 2024-09-03 DIAGNOSIS — E11.29 TYPE 2 DIABETES MELLITUS WITH PROTEINURIA (MULTI): Primary | ICD-10-CM

## 2024-09-03 DIAGNOSIS — E78.2 MIXED HYPERLIPIDEMIA: ICD-10-CM

## 2024-09-03 DIAGNOSIS — R80.9 TYPE 2 DIABETES MELLITUS WITH PROTEINURIA (MULTI): Primary | ICD-10-CM

## 2024-09-03 NOTE — TELEPHONE ENCOUNTER
Pt has an appointment 9/11/24 at 2:40 p.m. for her CPE. Pt is asking if you can place her blood work orders so she can get it done before her appointment so she doesn't have to fast until then?

## 2024-09-11 ENCOUNTER — LAB (OUTPATIENT)
Dept: LAB | Facility: LAB | Age: 59
End: 2024-09-11
Payer: COMMERCIAL

## 2024-09-11 ENCOUNTER — APPOINTMENT (OUTPATIENT)
Dept: PRIMARY CARE | Facility: CLINIC | Age: 59
End: 2024-09-11
Payer: COMMERCIAL

## 2024-09-11 VITALS
WEIGHT: 194 LBS | TEMPERATURE: 96.6 F | BODY MASS INDEX: 33.12 KG/M2 | DIASTOLIC BLOOD PRESSURE: 60 MMHG | HEIGHT: 64 IN | SYSTOLIC BLOOD PRESSURE: 122 MMHG

## 2024-09-11 DIAGNOSIS — I10 PRIMARY HYPERTENSION: ICD-10-CM

## 2024-09-11 DIAGNOSIS — E11.9 TYPE 2 DIABETES MELLITUS WITHOUT COMPLICATION, WITHOUT LONG-TERM CURRENT USE OF INSULIN (MULTI): ICD-10-CM

## 2024-09-11 DIAGNOSIS — E11.29 TYPE 2 DIABETES MELLITUS WITH PROTEINURIA (MULTI): ICD-10-CM

## 2024-09-11 DIAGNOSIS — Z23 NEED FOR VACCINATION: ICD-10-CM

## 2024-09-11 DIAGNOSIS — G43.119 INTRACTABLE MIGRAINE WITH AURA WITHOUT STATUS MIGRAINOSUS: ICD-10-CM

## 2024-09-11 DIAGNOSIS — E78.2 MIXED HYPERLIPIDEMIA: ICD-10-CM

## 2024-09-11 DIAGNOSIS — R80.9 TYPE 2 DIABETES MELLITUS WITH PROTEINURIA (MULTI): ICD-10-CM

## 2024-09-11 LAB
ALBUMIN SERPL BCP-MCNC: 4.3 G/DL (ref 3.4–5)
ALP SERPL-CCNC: 74 U/L (ref 33–110)
ALT SERPL W P-5'-P-CCNC: 10 U/L (ref 7–45)
ANION GAP SERPL CALC-SCNC: 15 MMOL/L (ref 10–20)
AST SERPL W P-5'-P-CCNC: 16 U/L (ref 9–39)
BILIRUB SERPL-MCNC: 0.8 MG/DL (ref 0–1.2)
BUN SERPL-MCNC: 13 MG/DL (ref 6–23)
CALCIUM SERPL-MCNC: 9.5 MG/DL (ref 8.6–10.6)
CHLORIDE SERPL-SCNC: 100 MMOL/L (ref 98–107)
CHOLEST SERPL-MCNC: 193 MG/DL (ref 0–199)
CHOLESTEROL/HDL RATIO: 3.3
CO2 SERPL-SCNC: 26 MMOL/L (ref 21–32)
CREAT SERPL-MCNC: 0.7 MG/DL (ref 0.5–1.05)
EGFRCR SERPLBLD CKD-EPI 2021: >90 ML/MIN/1.73M*2
ERYTHROCYTE [DISTWIDTH] IN BLOOD BY AUTOMATED COUNT: 14 % (ref 11.5–14.5)
EST. AVERAGE GLUCOSE BLD GHB EST-MCNC: 114 MG/DL
GLUCOSE SERPL-MCNC: 104 MG/DL (ref 74–99)
HBA1C MFR BLD: 5.6 %
HCT VFR BLD AUTO: 45 % (ref 36–46)
HDLC SERPL-MCNC: 59 MG/DL
HGB BLD-MCNC: 14.5 G/DL (ref 12–16)
LDLC SERPL CALC-MCNC: 119 MG/DL
MCH RBC QN AUTO: 28.6 PG (ref 26–34)
MCHC RBC AUTO-ENTMCNC: 32.2 G/DL (ref 32–36)
MCV RBC AUTO: 89 FL (ref 80–100)
NON HDL CHOLESTEROL: 134 MG/DL (ref 0–149)
NRBC BLD-RTO: 0 /100 WBCS (ref 0–0)
PLATELET # BLD AUTO: 317 X10*3/UL (ref 150–450)
POC ALBUMIN /CREATININE RATIO MANUALLY ENTERED: <30 UG/MG CREAT
POC URINE ALBUMIN: 10 MG/L
POC URINE CREATININE: 100 MG/DL
POTASSIUM SERPL-SCNC: 4 MMOL/L (ref 3.5–5.3)
PROT SERPL-MCNC: 7.3 G/DL (ref 6.4–8.2)
RBC # BLD AUTO: 5.07 X10*6/UL (ref 4–5.2)
SODIUM SERPL-SCNC: 137 MMOL/L (ref 136–145)
TRIGL SERPL-MCNC: 77 MG/DL (ref 0–149)
TSH SERPL-ACNC: 1.82 MIU/L (ref 0.44–3.98)
VLDL: 15 MG/DL (ref 0–40)
WBC # BLD AUTO: 7.1 X10*3/UL (ref 4.4–11.3)

## 2024-09-11 PROCEDURE — 3074F SYST BP LT 130 MM HG: CPT | Performed by: FAMILY MEDICINE

## 2024-09-11 PROCEDURE — 36415 COLL VENOUS BLD VENIPUNCTURE: CPT

## 2024-09-11 PROCEDURE — 90656 IIV3 VACC NO PRSV 0.5 ML IM: CPT | Performed by: FAMILY MEDICINE

## 2024-09-11 PROCEDURE — 3078F DIAST BP <80 MM HG: CPT | Performed by: FAMILY MEDICINE

## 2024-09-11 PROCEDURE — 85027 COMPLETE CBC AUTOMATED: CPT

## 2024-09-11 PROCEDURE — 82044 UR ALBUMIN SEMIQUANTITATIVE: CPT | Performed by: FAMILY MEDICINE

## 2024-09-11 PROCEDURE — 83036 HEMOGLOBIN GLYCOSYLATED A1C: CPT

## 2024-09-11 PROCEDURE — 84443 ASSAY THYROID STIM HORMONE: CPT

## 2024-09-11 PROCEDURE — 99396 PREV VISIT EST AGE 40-64: CPT | Performed by: FAMILY MEDICINE

## 2024-09-11 PROCEDURE — 80053 COMPREHEN METABOLIC PANEL: CPT

## 2024-09-11 PROCEDURE — 90471 IMMUNIZATION ADMIN: CPT | Performed by: FAMILY MEDICINE

## 2024-09-11 PROCEDURE — RXMED WILLOW AMBULATORY MEDICATION CHARGE

## 2024-09-11 PROCEDURE — 80061 LIPID PANEL: CPT

## 2024-09-11 PROCEDURE — 3008F BODY MASS INDEX DOCD: CPT | Performed by: FAMILY MEDICINE

## 2024-09-11 PROCEDURE — 1036F TOBACCO NON-USER: CPT | Performed by: FAMILY MEDICINE

## 2024-09-11 RX ORDER — ELETRIPTAN HYDROBROMIDE 40 MG/1
TABLET, FILM COATED ORAL
Qty: 6 TABLET | Refills: 2 | Status: SHIPPED | OUTPATIENT
Start: 2024-09-11 | End: 2025-09-11

## 2024-09-11 RX ORDER — LOVASTATIN 40 MG/1
80 TABLET ORAL DAILY
Qty: 90 TABLET | Refills: 0 | Status: SHIPPED | OUTPATIENT
Start: 2024-09-11 | End: 2024-09-11 | Stop reason: SDUPTHER

## 2024-09-11 RX ORDER — AMLODIPINE BESYLATE 5 MG/1
5 TABLET ORAL
Qty: 90 TABLET | Refills: 3 | Status: SHIPPED | OUTPATIENT
Start: 2024-09-11 | End: 2025-09-11

## 2024-09-11 RX ORDER — LISINOPRIL AND HYDROCHLOROTHIAZIDE 20; 25 MG/1; MG/1
1 TABLET ORAL DAILY
Qty: 90 TABLET | Refills: 3 | Status: SHIPPED | OUTPATIENT
Start: 2024-09-11 | End: 2025-09-11

## 2024-09-11 RX ORDER — LOVASTATIN 40 MG/1
80 TABLET ORAL DAILY
Qty: 180 TABLET | Refills: 3 | Status: SHIPPED | OUTPATIENT
Start: 2024-09-11 | End: 2025-09-11

## 2024-09-11 ASSESSMENT — PATIENT HEALTH QUESTIONNAIRE - PHQ9
1. LITTLE INTEREST OR PLEASURE IN DOING THINGS: NOT AT ALL
2. FEELING DOWN, DEPRESSED OR HOPELESS: NOT AT ALL
SUM OF ALL RESPONSES TO PHQ9 QUESTIONS 1 AND 2: 0

## 2024-09-11 NOTE — PROGRESS NOTES
"Chief complaint:   Chief Complaint   Patient presents with    Annual Exam     CPE       HPI:  Debbi Brown is a 58 y.o. female who presents for a physical    Exercise: dog walking twice daily, gym 4 times weekly  Alcohol: minimal, less then weekly, social use only    ROS:  Constitutional:  Admits to decreasing night sweats Denies fevers, chills  HEENT: Denies change in vision, change in hearing, sore throat, rhinorrhea, congestion  Cardiovascular: Denies chest pain, SOB, racing heart, slow heart rate, palpitations, leg edema  Pulmonary: Denies cough, wheezing, SOB  Gastrointestinal: Denies abdominal pain, diarrhea, constipation, nausea, vomiting, heartburn  Genitourinary: Denies dysuria, hematuria, incontinence, abnormal vaginal bleeding, abnormal vaginal discharge  Integumentary: Denies rash, new or changed skin lesions  Neuro: Denies headache, numbness, tingling  Musculoskeletal: Denies myalgias, arthralgias, back pain  Psych: Admits to change in sleep denies change in mood  Heme: denies bruising or bleeding     Physical exam:  /60   Temp 35.9 °C (96.6 °F)   Ht 1.619 m (5' 3.75\")   Wt 88 kg (194 lb)   BMI 33.56 kg/m²   General: NAD, well appearing female  Head: normocephalic  Ears: EAC patent, TM normal bilaterally  Eyes: EOM intact, PERRLA  Nose: moist  Mouth: moist, good dentition  Heart: RRR, +3 grade systolic murmur appreciated left sternal border  Lungs: CTAB, no wheezes, rales, rhonchi  Abdomen: soft, non tender, no organomegaly  Psych: mood and affect congruent, alert and oriented  MSK: +5/5 gross strength  Neuro: +2/4 patellar and biceps reflexes, sensation grossly intact  Skin: warm and dry    Assessment/Plan   Problem List Items Addressed This Visit       Hyperlipidemia    Relevant Medications    lovastatin (Mevacor) 40 mg tablet    Hypertension    Relevant Medications    lisinopriL-hydrochlorothiazide 20-25 mg tablet    amLODIPine (Norvasc) 5 mg tablet    Migraines    Relevant Medications "    eletriptan (Relpax) 40 mg tablet    Type 2 diabetes mellitus with proteinuria (Multi)    Relevant Orders    POCT Albumin Random Urine manually resulted (Completed)     Other Visit Diagnoses       Need for vaccination        Relevant Orders    Flu vaccine, trivalent, preservative free, age 6 months and greater (Fluraix/Fluzone/Flulaval) (Completed)        Shingrix discussed and she will think about getting this  Mammogram is scheduled for the end of the month  PAP/HPV testing neagtive last 6/17/2020, due 5 years  Has an order for colonoscopy, not yet scheduled but plans to do this fall  Influenza vaccination given in office  Medications refilled as above, awaiting fasting labs which were draw this am and not yet resulted  IO urine microalbumin ok today without proteinuria       Mckenna Santoyo, DO

## 2024-09-12 ENCOUNTER — PHARMACY VISIT (OUTPATIENT)
Dept: PHARMACY | Facility: CLINIC | Age: 59
End: 2024-09-12
Payer: COMMERCIAL

## 2024-09-13 PROCEDURE — RXMED WILLOW AMBULATORY MEDICATION CHARGE

## 2024-09-18 ENCOUNTER — PHARMACY VISIT (OUTPATIENT)
Dept: PHARMACY | Facility: CLINIC | Age: 59
End: 2024-09-18
Payer: COMMERCIAL

## 2024-09-20 PROCEDURE — RXMED WILLOW AMBULATORY MEDICATION CHARGE

## 2024-09-21 ENCOUNTER — PHARMACY VISIT (OUTPATIENT)
Dept: PHARMACY | Facility: CLINIC | Age: 59
End: 2024-09-21
Payer: COMMERCIAL

## 2024-09-30 ENCOUNTER — HOSPITAL ENCOUNTER (OUTPATIENT)
Dept: RADIOLOGY | Facility: CLINIC | Age: 59
Discharge: HOME | End: 2024-09-30
Payer: COMMERCIAL

## 2024-09-30 VITALS — BODY MASS INDEX: 34.02 KG/M2 | WEIGHT: 192 LBS | HEIGHT: 63 IN

## 2024-09-30 DIAGNOSIS — Z12.31 ENCOUNTER FOR SCREENING MAMMOGRAM FOR MALIGNANT NEOPLASM OF BREAST: ICD-10-CM

## 2024-09-30 PROCEDURE — 77067 SCR MAMMO BI INCL CAD: CPT

## 2024-09-30 PROCEDURE — 77067 SCR MAMMO BI INCL CAD: CPT | Performed by: RADIOLOGY

## 2024-09-30 PROCEDURE — 77063 BREAST TOMOSYNTHESIS BI: CPT | Performed by: RADIOLOGY

## 2024-10-04 DIAGNOSIS — Z12.11 SCREENING FOR COLON CANCER: Primary | ICD-10-CM

## 2024-10-04 PROCEDURE — RXMED WILLOW AMBULATORY MEDICATION CHARGE

## 2024-10-04 RX ORDER — POLYETHYLENE GLYCOL 3350, SODIUM SULFATE ANHYDROUS, SODIUM BICARBONATE, SODIUM CHLORIDE, POTASSIUM CHLORIDE 236; 22.74; 6.74; 5.86; 2.97 G/4L; G/4L; G/4L; G/4L; G/4L
4 POWDER, FOR SOLUTION ORAL ONCE
Qty: 4000 ML | Refills: 0 | Status: SHIPPED | OUTPATIENT
Start: 2024-10-04 | End: 2024-10-09

## 2024-10-08 ENCOUNTER — PHARMACY VISIT (OUTPATIENT)
Dept: PHARMACY | Facility: CLINIC | Age: 59
End: 2024-10-08
Payer: COMMERCIAL

## 2024-12-11 ENCOUNTER — ANESTHESIA EVENT (OUTPATIENT)
Dept: OPERATING ROOM | Facility: CLINIC | Age: 59
End: 2024-12-11
Payer: COMMERCIAL

## 2024-12-12 ENCOUNTER — ANESTHESIA (OUTPATIENT)
Dept: OPERATING ROOM | Facility: CLINIC | Age: 59
End: 2024-12-12
Payer: COMMERCIAL

## 2024-12-12 ENCOUNTER — HOSPITAL ENCOUNTER (OUTPATIENT)
Dept: OPERATING ROOM | Facility: CLINIC | Age: 59
Discharge: HOME | End: 2024-12-12
Payer: COMMERCIAL

## 2024-12-12 VITALS
RESPIRATION RATE: 18 BRPM | WEIGHT: 185.63 LBS | HEART RATE: 78 BPM | BODY MASS INDEX: 32.89 KG/M2 | TEMPERATURE: 97.5 F | HEIGHT: 63 IN | SYSTOLIC BLOOD PRESSURE: 94 MMHG | DIASTOLIC BLOOD PRESSURE: 53 MMHG | OXYGEN SATURATION: 98 %

## 2024-12-12 DIAGNOSIS — Z12.11 COLON CANCER SCREENING: ICD-10-CM

## 2024-12-12 LAB — GLUCOSE BLD MANUAL STRIP-MCNC: 108 MG/DL (ref 74–99)

## 2024-12-12 PROCEDURE — 2500000004 HC RX 250 GENERAL PHARMACY W/ HCPCS (ALT 636 FOR OP/ED): Performed by: ANESTHESIOLOGIST ASSISTANT

## 2024-12-12 PROCEDURE — 3700000002 HC GENERAL ANESTHESIA TIME - EACH INCREMENTAL 1 MINUTE: Performed by: ANESTHESIOLOGY

## 2024-12-12 PROCEDURE — 82947 ASSAY GLUCOSE BLOOD QUANT: CPT

## 2024-12-12 PROCEDURE — A45385 PR COLONOSCOPY,REMV LESN,SNARE: Performed by: ANESTHESIOLOGY

## 2024-12-12 PROCEDURE — 3600000007 HC OR TIME - EACH INCREMENTAL 1 MINUTE - PROCEDURE LEVEL TWO: Performed by: ANESTHESIOLOGY

## 2024-12-12 PROCEDURE — 3600000002 HC OR TIME - INITIAL BASE CHARGE - PROCEDURE LEVEL TWO: Performed by: ANESTHESIOLOGY

## 2024-12-12 PROCEDURE — 45385 COLONOSCOPY W/LESION REMOVAL: CPT | Performed by: INTERNAL MEDICINE

## 2024-12-12 PROCEDURE — A45385 PR COLONOSCOPY,REMV LESN,SNARE: Performed by: ANESTHESIOLOGIST ASSISTANT

## 2024-12-12 PROCEDURE — 3700000001 HC GENERAL ANESTHESIA TIME - INITIAL BASE CHARGE: Performed by: ANESTHESIOLOGY

## 2024-12-12 PROCEDURE — 7100000009 HC PHASE TWO TIME - INITIAL BASE CHARGE: Performed by: ANESTHESIOLOGY

## 2024-12-12 PROCEDURE — 7100000010 HC PHASE TWO TIME - EACH INCREMENTAL 1 MINUTE: Performed by: ANESTHESIOLOGY

## 2024-12-12 RX ORDER — ONDANSETRON HYDROCHLORIDE 2 MG/ML
4 INJECTION, SOLUTION INTRAVENOUS ONCE AS NEEDED
Status: DISCONTINUED | OUTPATIENT
Start: 2024-12-12 | End: 2024-12-13 | Stop reason: HOSPADM

## 2024-12-12 RX ORDER — ONDANSETRON HYDROCHLORIDE 2 MG/ML
INJECTION, SOLUTION INTRAVENOUS AS NEEDED
Status: DISCONTINUED | OUTPATIENT
Start: 2024-12-12 | End: 2024-12-12

## 2024-12-12 RX ORDER — SODIUM CHLORIDE, SODIUM LACTATE, POTASSIUM CHLORIDE, CALCIUM CHLORIDE 600; 310; 30; 20 MG/100ML; MG/100ML; MG/100ML; MG/100ML
100 INJECTION, SOLUTION INTRAVENOUS CONTINUOUS
Status: DISCONTINUED | OUTPATIENT
Start: 2024-12-12 | End: 2024-12-13 | Stop reason: HOSPADM

## 2024-12-12 RX ORDER — LIDOCAINE HYDROCHLORIDE 20 MG/ML
INJECTION, SOLUTION INFILTRATION; PERINEURAL AS NEEDED
Status: DISCONTINUED | OUTPATIENT
Start: 2024-12-12 | End: 2024-12-12

## 2024-12-12 RX ORDER — LIDOCAINE HYDROCHLORIDE 10 MG/ML
0.1 INJECTION, SOLUTION EPIDURAL; INFILTRATION; INTRACAUDAL; PERINEURAL ONCE
Status: DISCONTINUED | OUTPATIENT
Start: 2024-12-12 | End: 2024-12-13 | Stop reason: HOSPADM

## 2024-12-12 RX ORDER — PROPOFOL 10 MG/ML
INJECTION, EMULSION INTRAVENOUS CONTINUOUS PRN
Status: DISCONTINUED | OUTPATIENT
Start: 2024-12-12 | End: 2024-12-12

## 2024-12-12 SDOH — HEALTH STABILITY: MENTAL HEALTH: CURRENT SMOKER: 0

## 2024-12-12 ASSESSMENT — ENCOUNTER SYMPTOMS
ARTHRALGIAS: 0
JOINT SWELLING: 0
VOMITING: 0
COLOR CHANGE: 0
SLEEP DISTURBANCE: 0
SHORTNESS OF BREATH: 0
TROUBLE SWALLOWING: 0
UNEXPECTED WEIGHT CHANGE: 0
WHEEZING: 0
CHILLS: 0
DIZZINESS: 0
NAUSEA: 0
ABDOMINAL PAIN: 0
LIGHT-HEADEDNESS: 0
FEVER: 0
COUGH: 0
CONSTIPATION: 0
ABDOMINAL DISTENTION: 0
DIFFICULTY URINATING: 0
HEADACHES: 0
SPEECH DIFFICULTY: 0
DIARRHEA: 0
CONFUSION: 0

## 2024-12-12 ASSESSMENT — PAIN SCALES - GENERAL
PAIN_LEVEL: 4
PAINLEVEL_OUTOF10: 0 - NO PAIN

## 2024-12-12 ASSESSMENT — PAIN - FUNCTIONAL ASSESSMENT
PAIN_FUNCTIONAL_ASSESSMENT: 0-10
PAIN_FUNCTIONAL_ASSESSMENT: 0-10

## 2024-12-12 ASSESSMENT — COLUMBIA-SUICIDE SEVERITY RATING SCALE - C-SSRS
2. HAVE YOU ACTUALLY HAD ANY THOUGHTS OF KILLING YOURSELF?: NO
1. IN THE PAST MONTH, HAVE YOU WISHED YOU WERE DEAD OR WISHED YOU COULD GO TO SLEEP AND NOT WAKE UP?: NO
6. HAVE YOU EVER DONE ANYTHING, STARTED TO DO ANYTHING, OR PREPARED TO DO ANYTHING TO END YOUR LIFE?: NO

## 2024-12-12 NOTE — PERIOPERATIVE NURSING NOTE
.Patient is A&O x4, VSS, respers even and unlabored. Discharge instructions, follow up, and medications reviewed. Questions answered, patient and family verbalized understanding.  JASEN Leahy RN at bedside with discharge instructions.

## 2024-12-12 NOTE — ANESTHESIA POSTPROCEDURE EVALUATION
Patient: Debbi Brown    Procedure Summary       Date: 12/12/24 Room / Location: [facility] OR    Anesthesia Start: 1159 Anesthesia Stop: 1238    Procedure: COLONOSCOPY Diagnosis: Colon cancer screening    Scheduled Providers: Josh Connell MD Responsible Provider: Eliezer Cochran MD    Anesthesia Type: MAC ASA Status: 2            Anesthesia Type: MAC    Vitals Value Taken Time   BP 92/53 12/12/24 1248   Temp 37.6 °C (99.7 °F) 12/12/24 1237   Pulse 84 12/12/24 1248   Resp 18 12/12/24 1248   SpO2 99 % 12/12/24 1248       Anesthesia Post Evaluation    Patient location during evaluation: PACU  Patient participation: complete - patient participated  Level of consciousness: awake and alert  Pain score: 4  Pain management: adequate  Airway patency: patent  Cardiovascular status: acceptable  Respiratory status: acceptable  Hydration status: acceptable  Postoperative Nausea and Vomiting: none    There were no known notable events for this encounter.

## 2024-12-12 NOTE — H&P
History Of Present Illness  Debbi Brown is a 58 y.o. female presenting with colonoscopy.     Past Medical History  Past Medical History:   Diagnosis Date    Body mass index (BMI) 39.0-39.9, adult 08/24/2021    BMI 39.0-39.9,adult    Headache 1980    Hyperlipidemia     Hypertension 2001    Type 2 diabetes mellitus        Surgical History  Past Surgical History:   Procedure Laterality Date    OTHER SURGICAL HISTORY  01/30/2019    Tonsillectomy    WISDOM TOOTH EXTRACTION  1985        Social History  She reports that she has never smoked. She has never used smokeless tobacco. She reports current alcohol use. She reports that she does not use drugs.    Family History  Family History   Problem Relation Name Age of Onset    Diabetes Mother Nicki     Hypertension Mother Nicki     Coronary artery disease Father Tre     Diabetes Father Tre     Hyperlipidemia Father Tre     Hypertension Father Tre     Diabetes Maternal Grandmother      Diabetes Maternal Grandfather Maikol     Colon cancer Maternal Grandfather Maikol     Diabetes Paternal Grandmother Tiffany     Diabetes Paternal Grandfather Az         Allergies  Patient has no known allergies.    Review of Systems   Constitutional:  Negative for chills, fever and unexpected weight change.   HENT:  Negative for congestion and trouble swallowing.    Respiratory:  Negative for cough, shortness of breath and wheezing.    Cardiovascular:  Negative for chest pain.   Gastrointestinal:  Negative for abdominal distention, abdominal pain, constipation, diarrhea, nausea and vomiting.   Genitourinary:  Negative for difficulty urinating.   Musculoskeletal:  Negative for arthralgias and joint swelling.   Skin:  Negative for color change.   Neurological:  Negative for dizziness, speech difficulty, light-headedness and headaches.   Psychiatric/Behavioral:  Negative for confusion and sleep disturbance.         Physical Exam  Constitutional:       General: She is awake.      Appearance:  "Normal appearance.   HENT:      Head: Normocephalic and atraumatic.      Nose: Nose normal.      Mouth/Throat:      Mouth: Mucous membranes are moist.   Eyes:      Pupils: Pupils are equal, round, and reactive to light.   Neck:      Thyroid: No thyroid mass.      Trachea: Phonation normal.   Cardiovascular:      Rate and Rhythm: Normal rate and regular rhythm.      Heart sounds: Normal heart sounds. No murmur heard.     No gallop.   Pulmonary:      Effort: Pulmonary effort is normal. No respiratory distress.      Breath sounds: Normal air entry. No decreased breath sounds, wheezing, rhonchi or rales.   Abdominal:      General: Bowel sounds are normal. There is no distension.      Palpations: Abdomen is soft.      Tenderness: There is no abdominal tenderness.   Musculoskeletal:      Cervical back: Neck supple.      Right lower leg: No edema.      Left lower leg: No edema.   Skin:     General: Skin is warm.      Capillary Refill: Capillary refill takes less than 2 seconds.   Neurological:      General: No focal deficit present.      Mental Status: She is alert and oriented to person, place, and time. Mental status is at baseline.      Cranial Nerves: Cranial nerves 2-12 are intact.      Motor: Motor function is intact.   Psychiatric:         Attention and Perception: Attention and perception normal.         Mood and Affect: Mood normal.         Speech: Speech normal.         Behavior: Behavior normal.          Last Recorded Vitals  Blood pressure 132/74, pulse 105, temperature 36.4 °C (97.5 °F), temperature source Temporal, resp. rate 16, height 1.6 m (5' 3\"), weight 84.2 kg (185 lb 10 oz), SpO2 97%.    Relevant Results             Assessment/Plan   Assessment & Plan  Colon cancer screening             Proceed with colonoscopy  Josh Connell MD    "

## 2024-12-12 NOTE — DISCHARGE INSTRUCTIONS
During the first 24 hours after your procedure, you should:    - Resume normal diet, unless otherwise directed by your doctor.  - Resume your home medications, unless otherwise directed by your doctor.  - Refrain from driving or operative heavy machinery.  - Drink plenty of liquids.  - Avoid consuming alcohol.  - Avoid strenuous activity or heavy lifting.    After 24 hours, you can resume regular activity.    Call your doctor office immediately ([phone]) or come to the nearest emergency room if you experience:    - Abdominal tenderness  - Blood in your stool or vomit  - Difficulty urinating or passing stools  - Difficulty breathing  - Chest pain  - Fever       If you experience any problems or have any questions following discharge from the GI Lab, please call:   Dr. Connell [phone].   To reach your physician after hours call [phone] and ask for the GI physician on call.

## 2024-12-12 NOTE — ANESTHESIA PREPROCEDURE EVALUATION
Patient: Debbi Brown    Procedure Information       Anesthesia Start Date/Time: 12/12/24 1159    Scheduled providers: Josh Connell MD    Procedure: COLONOSCOPY    Location: [facility] OR            Relevant Problems   Cardiac   (+) Hyperlipidemia   (+) Hypertension      Endocrine   (+) Morbid obesity (Multi)   (+) Type 2 diabetes mellitus with proteinuria (Multi)       Clinical information reviewed:   Tobacco  Allergies  Meds   Med Hx  Surg Hx   Fam Hx  Soc Hx        NPO Detail:  NPO/Void Status  Date of Last Liquid: 12/12/24  Time of Last Liquid: 0445 (prep)  Date of Last Solid: 12/11/24  Time of Last Solid: 0900         Physical Exam    Airway  Mallampati: I  TM distance: >3 FB  Neck ROM: full     Cardiovascular - normal exam     Dental - normal exam     Pulmonary - normal exam     Abdominal - normal exam         Anesthesia Plan    History of general anesthesia?: yes  History of complications of general anesthesia?: no    ASA 2     MAC     The patient is not a current smoker.  Patient was not previously instructed to abstain from smoking on day of procedure.  Patient did not smoke on day of procedure.    intravenous induction   Postoperative administration of opioids is intended.  Anesthetic plan and risks discussed with patient.  Use of blood products discussed with patient who.    Plan discussed with CAA.

## 2024-12-13 ASSESSMENT — PAIN SCALES - GENERAL: PAINLEVEL_OUTOF10: 0 - NO PAIN

## 2024-12-16 PROCEDURE — RXMED WILLOW AMBULATORY MEDICATION CHARGE

## 2024-12-19 ENCOUNTER — PHARMACY VISIT (OUTPATIENT)
Dept: PHARMACY | Facility: CLINIC | Age: 59
End: 2024-12-19
Payer: COMMERCIAL

## 2024-12-19 LAB
LABORATORY COMMENT REPORT: NORMAL
PATH REPORT.FINAL DX SPEC: NORMAL
PATH REPORT.GROSS SPEC: NORMAL
PATH REPORT.TOTAL CANCER: NORMAL

## 2024-12-20 ENCOUNTER — PHARMACY VISIT (OUTPATIENT)
Dept: PHARMACY | Facility: CLINIC | Age: 59
End: 2024-12-20
Payer: COMMERCIAL

## 2024-12-22 ENCOUNTER — OFFICE VISIT (OUTPATIENT)
Dept: URGENT CARE | Age: 59
End: 2024-12-22
Payer: COMMERCIAL

## 2024-12-22 VITALS
SYSTOLIC BLOOD PRESSURE: 156 MMHG | TEMPERATURE: 97.9 F | HEIGHT: 63 IN | HEART RATE: 94 BPM | RESPIRATION RATE: 18 BRPM | WEIGHT: 185 LBS | DIASTOLIC BLOOD PRESSURE: 89 MMHG | BODY MASS INDEX: 32.78 KG/M2 | OXYGEN SATURATION: 98 %

## 2024-12-22 DIAGNOSIS — N39.0 URINARY TRACT INFECTION WITHOUT HEMATURIA, SITE UNSPECIFIED: ICD-10-CM

## 2024-12-22 LAB
POC APPEARANCE, URINE: ABNORMAL
POC BILIRUBIN, URINE: NEGATIVE
POC BLOOD, URINE: ABNORMAL
POC COLOR, URINE: ABNORMAL
POC GLUCOSE, URINE: ABNORMAL MG/DL
POC KETONES, URINE: NEGATIVE MG/DL
POC LEUKOCYTES, URINE: ABNORMAL
POC NITRITE,URINE: NEGATIVE
POC PH, URINE: 6 PH
POC PROTEIN, URINE: NEGATIVE MG/DL
POC SPECIFIC GRAVITY, URINE: 1.01
POC UROBILINOGEN, URINE: 0.2 EU/DL

## 2024-12-22 PROCEDURE — 3044F HG A1C LEVEL LT 7.0%: CPT | Performed by: FAMILY MEDICINE

## 2024-12-22 PROCEDURE — 87186 SC STD MICRODIL/AGAR DIL: CPT

## 2024-12-22 PROCEDURE — 1036F TOBACCO NON-USER: CPT | Performed by: FAMILY MEDICINE

## 2024-12-22 PROCEDURE — 3008F BODY MASS INDEX DOCD: CPT | Performed by: FAMILY MEDICINE

## 2024-12-22 PROCEDURE — 81003 URINALYSIS AUTO W/O SCOPE: CPT | Performed by: FAMILY MEDICINE

## 2024-12-22 PROCEDURE — 99070 SPECIAL SUPPLIES PHYS/QHP: CPT | Performed by: FAMILY MEDICINE

## 2024-12-22 PROCEDURE — 3079F DIAST BP 80-89 MM HG: CPT | Performed by: FAMILY MEDICINE

## 2024-12-22 PROCEDURE — 3077F SYST BP >= 140 MM HG: CPT | Performed by: FAMILY MEDICINE

## 2024-12-22 PROCEDURE — 99203 OFFICE O/P NEW LOW 30 MIN: CPT | Performed by: FAMILY MEDICINE

## 2024-12-22 PROCEDURE — 87086 URINE CULTURE/COLONY COUNT: CPT

## 2024-12-22 PROCEDURE — 3049F LDL-C 100-129 MG/DL: CPT | Performed by: FAMILY MEDICINE

## 2024-12-22 RX ORDER — NITROFURANTOIN 25; 75 MG/1; MG/1
100 CAPSULE ORAL 2 TIMES DAILY
Qty: 14 CAPSULE | Refills: 0 | Status: SHIPPED | OUTPATIENT
Start: 2024-12-22 | End: 2024-12-29

## 2024-12-22 ASSESSMENT — ENCOUNTER SYMPTOMS
ABDOMINAL PAIN: 0
SHORTNESS OF BREATH: 0
WHEEZING: 0
FREQUENCY: 1
FEVER: 0
VOMITING: 0
CHEST TIGHTNESS: 0
CHILLS: 0
NAUSEA: 0
COUGH: 0
DYSURIA: 0
DIARRHEA: 0
CONSTIPATION: 0

## 2024-12-23 NOTE — PROGRESS NOTES
"Subjective   Patient ID: Debbi Brown is a 58 y.o. female. They present today with a chief complaint of UTI (Experiencing urgency and burning. Going on x1 day ).    History of Present Illness    History provided by:  Patient   used: No    UTI  Quality:  Frequency and Urgency  Severity:  Moderate (4/10)  Onset quality:  Sudden  Duration:  7 hours  Timing:  Constant  Progression:  Worsening  Chronicity:  New  Relieved by:  AZO  Associated symptoms: no abdominal pain, no chest pain, no cough, no diarrhea, no fever, no nausea, no shortness of breath, no vomiting and no wheezing        Past Medical History  Allergies as of 12/22/2024    (No Known Allergies)       (Not in a hospital admission)       Past Medical History:   Diagnosis Date    Body mass index (BMI) 39.0-39.9, adult 08/24/2021    BMI 39.0-39.9,adult    Headache 1980    Hyperlipidemia     Hypertension 2001    Type 2 diabetes mellitus        Past Surgical History:   Procedure Laterality Date    OTHER SURGICAL HISTORY  01/30/2019    Tonsillectomy    WISDOM TOOTH EXTRACTION  1985        reports that she has never smoked. She has never used smokeless tobacco. She reports current alcohol use. She reports that she does not use drugs.    Review of Systems  Review of Systems   Constitutional:  Negative for chills and fever.   Respiratory:  Negative for cough, chest tightness, shortness of breath and wheezing.    Cardiovascular:  Negative for chest pain.   Gastrointestinal:  Negative for abdominal pain, constipation, diarrhea, nausea and vomiting.   Genitourinary:  Positive for frequency and urgency. Negative for dysuria.                                  Objective    Vitals:    12/22/24 1907   BP: 156/89   BP Location: Left arm   Patient Position: Sitting   Pulse: 94   Resp: 18   Temp: 36.6 °C (97.9 °F)   TempSrc: Oral   SpO2: 98%   Weight: 83.9 kg (185 lb)   Height: 1.6 m (5' 3\")     No LMP recorded. Patient is perimenopausal.    Physical " Exam  Vitals reviewed.   Constitutional:       General: She is not in acute distress.     Appearance: She is not ill-appearing.   Cardiovascular:      Rate and Rhythm: Normal rate and regular rhythm.      Heart sounds: No murmur heard.     No friction rub.   Pulmonary:      Effort: Pulmonary effort is normal. No respiratory distress.      Breath sounds: No wheezing, rhonchi or rales.   Abdominal:      General: Abdomen is flat. Bowel sounds are normal.      Palpations: Abdomen is soft. There is no mass.      Tenderness: There is abdominal tenderness in the left lower quadrant. There is no right CVA tenderness or left CVA tenderness.   Neurological:      Mental Status: She is alert.         Procedures    Point of Care Test & Imaging Results from this visit  Results for orders placed or performed in visit on 12/22/24   POCT UA Automated manually resulted   Result Value Ref Range    POC Color, Urine Light-Yellow Straw, Yellow, Light-Yellow    POC Appearance, Urine Cloudy (A) Clear    POC Glucose, Urine 500 (3+) (A) NEGATIVE mg/dl    POC Bilirubin, Urine NEGATIVE NEGATIVE    POC Ketones, Urine NEGATIVE NEGATIVE mg/dl    POC Specific Gravity, Urine 1.015 1.005 - 1.035    POC Blood, Urine LARGE (3+) (A) NEGATIVE    POC PH, Urine 6.0 No Reference Range Established PH    POC Protein, Urine NEGATIVE NEGATIVE mg/dl    POC Urobilinogen, Urine 0.2 0.2, 1.0 EU/DL    Poc Nitrite, Urine NEGATIVE NEGATIVE    POC Leukocytes, Urine SMALL (1+) (A) NEGATIVE      No results found.    Diagnostic study results (if any) were reviewed by Miguel Bennett DO.    Assessment/Plan   Allergies, medications, history, and pertinent labs/EKGs/Imaging reviewed by Miguel Bennett DO.     Orders and Diagnoses  Diagnoses and all orders for this visit:  Urinary tract infection without hematuria, site unspecified  -     POCT UA Automated manually resulted      Medical Admin Record      Patient disposition: Home    Electronically signed by Miguel Bennett  DO  7:27 PM

## 2024-12-25 LAB — BACTERIA UR CULT: ABNORMAL

## 2024-12-29 ENCOUNTER — OFFICE VISIT (OUTPATIENT)
Dept: URGENT CARE | Age: 59
End: 2024-12-29
Payer: COMMERCIAL

## 2024-12-29 VITALS
BODY MASS INDEX: 32.77 KG/M2 | SYSTOLIC BLOOD PRESSURE: 155 MMHG | OXYGEN SATURATION: 99 % | DIASTOLIC BLOOD PRESSURE: 85 MMHG | WEIGHT: 185 LBS | HEART RATE: 85 BPM | TEMPERATURE: 98.2 F | RESPIRATION RATE: 16 BRPM

## 2024-12-29 DIAGNOSIS — R03.0 ELEVATED BLOOD PRESSURE READING: ICD-10-CM

## 2024-12-29 DIAGNOSIS — N39.0 RECURRENT UTI: Primary | ICD-10-CM

## 2024-12-29 DIAGNOSIS — R81 GLUCOSURIA: ICD-10-CM

## 2024-12-29 DIAGNOSIS — R30.0 DYSURIA: ICD-10-CM

## 2024-12-29 LAB
POC APPEARANCE, URINE: CLEAR
POC BILIRUBIN, URINE: NEGATIVE
POC BLOOD, URINE: NEGATIVE
POC COLOR, URINE: YELLOW
POC GLUCOSE, URINE: ABNORMAL MG/DL
POC KETONES, URINE: NEGATIVE MG/DL
POC LEUKOCYTES, URINE: NEGATIVE
POC NITRITE,URINE: NEGATIVE
POC PROTEIN, URINE: NEGATIVE MG/DL
POC UROBILINOGEN, URINE: 0.2 EU/DL
PREGNANCY TEST URINE, POC: NEGATIVE

## 2024-12-29 PROCEDURE — 3044F HG A1C LEVEL LT 7.0%: CPT | Performed by: STUDENT IN AN ORGANIZED HEALTH CARE EDUCATION/TRAINING PROGRAM

## 2024-12-29 PROCEDURE — 3049F LDL-C 100-129 MG/DL: CPT | Performed by: STUDENT IN AN ORGANIZED HEALTH CARE EDUCATION/TRAINING PROGRAM

## 2024-12-29 PROCEDURE — 81025 URINE PREGNANCY TEST: CPT | Performed by: STUDENT IN AN ORGANIZED HEALTH CARE EDUCATION/TRAINING PROGRAM

## 2024-12-29 PROCEDURE — 81003 URINALYSIS AUTO W/O SCOPE: CPT | Performed by: STUDENT IN AN ORGANIZED HEALTH CARE EDUCATION/TRAINING PROGRAM

## 2024-12-29 PROCEDURE — 87086 URINE CULTURE/COLONY COUNT: CPT

## 2024-12-29 PROCEDURE — 3079F DIAST BP 80-89 MM HG: CPT | Performed by: STUDENT IN AN ORGANIZED HEALTH CARE EDUCATION/TRAINING PROGRAM

## 2024-12-29 PROCEDURE — 87205 SMEAR GRAM STAIN: CPT

## 2024-12-29 PROCEDURE — 99213 OFFICE O/P EST LOW 20 MIN: CPT | Performed by: STUDENT IN AN ORGANIZED HEALTH CARE EDUCATION/TRAINING PROGRAM

## 2024-12-29 PROCEDURE — 3077F SYST BP >= 140 MM HG: CPT | Performed by: STUDENT IN AN ORGANIZED HEALTH CARE EDUCATION/TRAINING PROGRAM

## 2024-12-29 NOTE — PROGRESS NOTES
Subjective   Patient ID: Debbi Brown is a 59 y.o. female. They present today with a chief complaint of Urinary Problem.    History of Present Illness  Debbi is a 59 year old female who presents to the urgent care for evaluation of burning with urination with recent UTI diagnosis and completed treatment with Macrobid.  Patient describes having 1 remaining day of Macrobid and overall feeling improved however continues to have some dysuria and would like evaluation reassurance to ensure and confirm UTI has cleared.  Patient states her diabetes management has been going well with Jardiance and states her A1c has been less than 8.  Patient denies vomiting, fever, severe abdominal pain or diarrhea.  No other symptoms or concerns otherwise reported.    Past Medical History  Allergies as of 12/29/2024    (No Known Allergies)       (Not in a hospital admission)       Past Medical History:   Diagnosis Date    Body mass index (BMI) 39.0-39.9, adult 08/24/2021    BMI 39.0-39.9,adult    Headache 1980    Hyperlipidemia     Hypertension 2001    Type 2 diabetes mellitus        Past Surgical History:   Procedure Laterality Date    OTHER SURGICAL HISTORY  01/30/2019    Tonsillectomy    WISDOM TOOTH EXTRACTION  1985        reports that she has never smoked. She has never used smokeless tobacco. She reports current alcohol use. She reports that she does not use drugs.    Review of Systems  A 10-point review of systems was performed, otherwise unremarkable unless stated in the history of present illness.                Objective    Vitals:    12/29/24 0809   BP: 155/85   Pulse: 85   Resp: 16   Temp: 36.8 °C (98.2 °F)   SpO2: 99%   Weight: 83.9 kg (185 lb)     No LMP recorded. Patient is perimenopausal.    Gen: Vitals noted and reviewed, no evidence of acute distress, well developed and afebrile.   Psych: Mood and affect appropriate for setting.  Head/Face: Atraumatic and normocephalic.   Neuro: No focal deficits noted.  Cardiac:  Regular rate and rhythm no murmur.   Lungs: Clear to auscultation throughout, No evidence of wheezing, rhonchi or crackles. Good aeration throughout.   Abdomen: Soft non-tender throughout. Normoactive bowel sounds. No evidence of palpable masses. No CVA tenderness    Extremities: Symmetrical, No peripheral edema  Skin: Without evidence of ecchymosis, wounds, or rashes.      Point of Care Test & Imaging Results from this visit  Results for orders placed or performed in visit on 12/29/24   POCT UA Automated manually resulted   Result Value Ref Range    POC Color, Urine Yellow Straw, Yellow, Light-Yellow    POC Appearance, Urine Clear Clear    POC Glucose, Urine 250 (2+) (A) NEGATIVE mg/dl    POC Bilirubin, Urine NEGATIVE NEGATIVE    POC Ketones, Urine NEGATIVE NEGATIVE mg/dl    POC Blood, Urine NEGATIVE NEGATIVE    POC Protein, Urine NEGATIVE NEGATIVE mg/dl    POC Urobilinogen, Urine 0.2 0.2, 1.0 EU/DL    Poc Nitrite, Urine NEGATIVE NEGATIVE    POC Leukocytes, Urine NEGATIVE NEGATIVE   POCT pregnancy, urine manually resulted   Result Value Ref Range    Preg Test, Ur Negative Negative      No results found.    Diagnostic study results (if any) were reviewed by Velma Trejo DO.    Assessment/Plan   Allergies, medications, history, and pertinent labs/EKGs/Imaging reviewed by Velma Trejo DO.     Medical Decision Making  Discussed with the patient symptoms of dysuria may be secondary to residual clearance of UTI versus glucosuria or other etiologies unspecified.  Given the patient's diabetic status in the setting of recurrent UTIs we did send urine for reculture to further assess and will follow-up on results and adjust treatment accordingly.  For now we advised completing initial course of treatment.  We also obtained BV/yeast swabs and symptom out and will also follow-up on these and adjust treatment accordingly. Follow up with PCP. We advised seeking immediate emergency medical attention if symptoms fail to improve,  worsen or any concerning symptoms arise. Patient voiced full understanding and agreement to plan.      Orders and Diagnoses  Diagnoses and all orders for this visit:  Recurrent UTI  -     Urine Culture  Dysuria  -     POCT UA Automated manually resulted  -     POCT pregnancy, urine manually resulted  -     Urine Culture  -     Vaginitis Gram Stain For Bacterial Vaginosis + Yeast  Elevated blood pressure reading  Glucosuria  -     Urine Culture      Medical Admin Record      Patient disposition: Home    Electronically signed by Velma Trejo DO  8:44 AM

## 2024-12-30 LAB
CLUE CELLS VAG LPF-#/AREA: NORMAL /[LPF]
NUGENT SCORE: 2
YEAST VAG WET PREP-#/AREA: NORMAL

## 2024-12-31 LAB — BACTERIA UR CULT: NORMAL

## 2025-01-07 ENCOUNTER — APPOINTMENT (OUTPATIENT)
Dept: PRIMARY CARE | Facility: CLINIC | Age: 60
End: 2025-01-07
Payer: COMMERCIAL

## 2025-01-07 VITALS
SYSTOLIC BLOOD PRESSURE: 122 MMHG | BODY MASS INDEX: 34.01 KG/M2 | TEMPERATURE: 97.3 F | WEIGHT: 192 LBS | DIASTOLIC BLOOD PRESSURE: 72 MMHG

## 2025-01-07 DIAGNOSIS — E78.2 MIXED HYPERLIPIDEMIA: ICD-10-CM

## 2025-01-07 DIAGNOSIS — I10 PRIMARY HYPERTENSION: ICD-10-CM

## 2025-01-07 DIAGNOSIS — E66.811 CLASS 1 OBESITY DUE TO EXCESS CALORIES WITH SERIOUS COMORBIDITY AND BODY MASS INDEX (BMI) OF 34.0 TO 34.9 IN ADULT: ICD-10-CM

## 2025-01-07 DIAGNOSIS — E66.09 CLASS 1 OBESITY DUE TO EXCESS CALORIES WITH SERIOUS COMORBIDITY AND BODY MASS INDEX (BMI) OF 34.0 TO 34.9 IN ADULT: ICD-10-CM

## 2025-01-07 DIAGNOSIS — R80.9 TYPE 2 DIABETES MELLITUS WITH PROTEINURIA (MULTI): Primary | ICD-10-CM

## 2025-01-07 DIAGNOSIS — E11.29 TYPE 2 DIABETES MELLITUS WITH PROTEINURIA (MULTI): Primary | ICD-10-CM

## 2025-01-07 PROBLEM — E66.9 OBESITY: Status: ACTIVE | Noted: 2023-07-14

## 2025-01-07 LAB — POC HEMOGLOBIN A1C: 5.9 % (ref 4.2–6.5)

## 2025-01-07 PROCEDURE — 99214 OFFICE O/P EST MOD 30 MIN: CPT | Performed by: FAMILY MEDICINE

## 2025-01-07 PROCEDURE — 83036 HEMOGLOBIN GLYCOSYLATED A1C: CPT | Performed by: FAMILY MEDICINE

## 2025-01-07 PROCEDURE — 1036F TOBACCO NON-USER: CPT | Performed by: FAMILY MEDICINE

## 2025-01-07 PROCEDURE — 3074F SYST BP LT 130 MM HG: CPT | Performed by: FAMILY MEDICINE

## 2025-01-07 PROCEDURE — 3078F DIAST BP <80 MM HG: CPT | Performed by: FAMILY MEDICINE

## 2025-01-07 ASSESSMENT — PATIENT HEALTH QUESTIONNAIRE - PHQ9
1. LITTLE INTEREST OR PLEASURE IN DOING THINGS: NOT AT ALL
SUM OF ALL RESPONSES TO PHQ9 QUESTIONS 1 AND 2: 0
2. FEELING DOWN, DEPRESSED OR HOPELESS: NOT AT ALL

## 2025-01-07 NOTE — ASSESSMENT & PLAN NOTE
- new dx 6/4/2024  - IO glucose 138, 2/27/2024  - IO Hba1C 5.9%, continue Jardiance 10 mg PO daily  - IO urine microalbumin ok 9/11/2024, continue Jardiance 10 mg PO daily  - IO lipids reviewed from 9/11/2024 continue Lovastatin 80 mg PO daily  - Prevnar 20 given in office 6/4/2024  - continue weight loss efforts  - follow up 3-6 mo, will need HbA1C, CMP, CBC, lipid, urine microalbumin, foot exam

## 2025-01-07 NOTE — PROGRESS NOTES
Chief complaint:   Chief Complaint   Patient presents with    4 month follow up       HPI:  Debbi Brown is a 59 y.o. female who presents for evaluation of her diabetes. She is tolerating her medication though had 1 recent UTI on the Jardiance prior to Christmas. Her sx are resolved at this point. She was treated. She notes she has been under a lot of stress with sick mother in [place] (previously very active, had compression fracture, had surgery 11/26/2024 now not wanting to leave the house). She is improving now.     Exercise: gym 4-5 times weekly and walks the dog  Alcohol: 1 x monthly  Tobacco: none  Drug: none    Physical exam:  /72 (BP Location: Left arm, Patient Position: Sitting)   Temp 36.3 °C (97.3 °F)   Wt 87.1 kg (192 lb)   BMI 34.01 kg/m²   General: NAD, well appearing female  Heart: RRR, no mumur appreciated  Lungs: CTAB, no wheezes, rales, rhonchi  Abdomen: soft, non tender, normoactive BS, no organomegaly    Assessment/Plan   Problem List Items Addressed This Visit       Hyperlipidemia     - continue Lovastatin 80 mg PO QHS  - last lipids 9/11/2024         Hypertension     - at goal  - continue weight loss efforts and regular exercise  - continue current medication therapy  - follow up 6 mo         Obesity    Type 2 diabetes mellitus with proteinuria (Multi) - Primary     - new dx 6/4/2024  - IO glucose 138, 2/27/2024  - IO Hba1C 5.9%, continue Jardiance 10 mg PO daily  - IO urine microalbumin ok 9/11/2024, continue Jardiance 10 mg PO daily  - IO lipids reviewed from 9/11/2024 continue Lovastatin 80 mg PO daily  - Prevnar 20 given in office 6/4/2024  - continue weight loss efforts  - follow up 3-6 mo, will need HbA1C, CMP, CBC, lipid, urine microalbumin, foot exam         Relevant Orders    POCT glycosylated hemoglobin (Hb A1C) manually resulted (Completed)       Mckenna Santoyo, DO

## 2025-01-13 PROCEDURE — RXMED WILLOW AMBULATORY MEDICATION CHARGE

## 2025-01-16 ENCOUNTER — PHARMACY VISIT (OUTPATIENT)
Dept: PHARMACY | Facility: CLINIC | Age: 60
End: 2025-01-16
Payer: COMMERCIAL

## 2025-03-20 PROCEDURE — RXMED WILLOW AMBULATORY MEDICATION CHARGE

## 2025-03-21 ENCOUNTER — PHARMACY VISIT (OUTPATIENT)
Dept: PHARMACY | Facility: CLINIC | Age: 60
End: 2025-03-21
Payer: COMMERCIAL

## 2025-03-26 PROCEDURE — RXMED WILLOW AMBULATORY MEDICATION CHARGE

## 2025-03-31 ENCOUNTER — PHARMACY VISIT (OUTPATIENT)
Dept: PHARMACY | Facility: CLINIC | Age: 60
End: 2025-03-31
Payer: COMMERCIAL

## 2025-04-14 PROCEDURE — RXMED WILLOW AMBULATORY MEDICATION CHARGE

## 2025-04-17 ENCOUNTER — PHARMACY VISIT (OUTPATIENT)
Dept: PHARMACY | Facility: CLINIC | Age: 60
End: 2025-04-17
Payer: COMMERCIAL

## 2025-04-29 ENCOUNTER — OFFICE VISIT (OUTPATIENT)
Dept: PRIMARY CARE | Facility: CLINIC | Age: 60
End: 2025-04-29
Payer: COMMERCIAL

## 2025-04-29 VITALS
SYSTOLIC BLOOD PRESSURE: 120 MMHG | BODY MASS INDEX: 32.95 KG/M2 | WEIGHT: 186 LBS | TEMPERATURE: 96.5 F | DIASTOLIC BLOOD PRESSURE: 74 MMHG

## 2025-04-29 DIAGNOSIS — H81.10 BENIGN PAROXYSMAL POSITIONAL VERTIGO, UNSPECIFIED LATERALITY: Primary | ICD-10-CM

## 2025-04-29 DIAGNOSIS — G43.119 INTRACTABLE MIGRAINE WITH AURA WITHOUT STATUS MIGRAINOSUS: ICD-10-CM

## 2025-04-29 PROCEDURE — 1036F TOBACCO NON-USER: CPT | Performed by: FAMILY MEDICINE

## 2025-04-29 PROCEDURE — 3078F DIAST BP <80 MM HG: CPT | Performed by: FAMILY MEDICINE

## 2025-04-29 PROCEDURE — 99213 OFFICE O/P EST LOW 20 MIN: CPT | Performed by: FAMILY MEDICINE

## 2025-04-29 PROCEDURE — 3074F SYST BP LT 130 MM HG: CPT | Performed by: FAMILY MEDICINE

## 2025-04-29 PROCEDURE — RXMED WILLOW AMBULATORY MEDICATION CHARGE

## 2025-04-29 PROCEDURE — 3044F HG A1C LEVEL LT 7.0%: CPT | Performed by: FAMILY MEDICINE

## 2025-04-29 RX ORDER — ELETRIPTAN HYDROBROMIDE 40 MG/1
TABLET, FILM COATED ORAL
Qty: 6 TABLET | Refills: 2 | Status: SHIPPED | OUTPATIENT
Start: 2025-04-29 | End: 2026-04-29

## 2025-04-29 NOTE — PROGRESS NOTES
Chief complaint:   Chief Complaint   Patient presents with    Dizziness     Started last week, worse when changing position, improving today       HPI:  Debbi Brown is a 59 y.o. female who presents for evaluation of dizziness which started last week after doing yoga. She was in the class at the end laying down and the instructor was beating adrum and she felt like she was on a tilt-a-world. When she got up she was less dizzy. She was able to drive home as it was improved until she went to bed that night and it returned when she rolled over in bed. Especaly rolling onto her right side the sx worsen. Bendinng forward makes it worse. She took 2 days without driving due to the sx but was back to driving. She is still triggered by rolling to the right side, getting up quickly, bending down or looking up. Today is better then she has been but still with some sx.     Physical exam:  /74 (BP Location: Right arm, Patient Position: Sitting)   Temp 35.8 °C (96.5 °F)   Wt 84.4 kg (186 lb)   BMI 32.95 kg/m²   General: NAD, well appearing female  Heart: RRR, no mumur appreciated  Lungs: CTAB, no wheezes, rales, rhonchi  Abdomen: soft, non tender, normoactive BS, no organomegaly  Extremities: No LE edema    Assessment/Plan   Problem List Items Addressed This Visit       Migraines    Relevant Medications    eletriptan (Relpax) 40 mg tablet       Mckenna Santoyo DO         migraines    Mckenna Santoyo,

## 2025-05-02 ENCOUNTER — PHARMACY VISIT (OUTPATIENT)
Dept: PHARMACY | Facility: CLINIC | Age: 60
End: 2025-05-02
Payer: COMMERCIAL

## 2025-06-12 PROCEDURE — RXMED WILLOW AMBULATORY MEDICATION CHARGE

## 2025-06-17 ENCOUNTER — PHARMACY VISIT (OUTPATIENT)
Dept: PHARMACY | Facility: CLINIC | Age: 60
End: 2025-06-17
Payer: COMMERCIAL

## 2025-06-24 ENCOUNTER — APPOINTMENT (OUTPATIENT)
Dept: PRIMARY CARE | Facility: CLINIC | Age: 60
End: 2025-06-24
Payer: COMMERCIAL

## 2025-06-24 VITALS
BODY MASS INDEX: 33.3 KG/M2 | DIASTOLIC BLOOD PRESSURE: 72 MMHG | TEMPERATURE: 96.1 F | WEIGHT: 188 LBS | SYSTOLIC BLOOD PRESSURE: 122 MMHG

## 2025-06-24 DIAGNOSIS — E11.29 TYPE 2 DIABETES MELLITUS WITH PROTEINURIA (MULTI): ICD-10-CM

## 2025-06-24 DIAGNOSIS — I10 PRIMARY HYPERTENSION: ICD-10-CM

## 2025-06-24 DIAGNOSIS — E11.9 TYPE 2 DIABETES MELLITUS WITHOUT COMPLICATION, WITHOUT LONG-TERM CURRENT USE OF INSULIN: Primary | ICD-10-CM

## 2025-06-24 DIAGNOSIS — G43.119 INTRACTABLE MIGRAINE WITH AURA WITHOUT STATUS MIGRAINOSUS: ICD-10-CM

## 2025-06-24 DIAGNOSIS — E78.2 MIXED HYPERLIPIDEMIA: ICD-10-CM

## 2025-06-24 DIAGNOSIS — Z12.31 ENCOUNTER FOR SCREENING MAMMOGRAM FOR MALIGNANT NEOPLASM OF BREAST: ICD-10-CM

## 2025-06-24 DIAGNOSIS — R80.9 TYPE 2 DIABETES MELLITUS WITH PROTEINURIA (MULTI): ICD-10-CM

## 2025-06-24 PROCEDURE — 3078F DIAST BP <80 MM HG: CPT | Performed by: FAMILY MEDICINE

## 2025-06-24 PROCEDURE — 99214 OFFICE O/P EST MOD 30 MIN: CPT | Performed by: FAMILY MEDICINE

## 2025-06-24 PROCEDURE — 1036F TOBACCO NON-USER: CPT | Performed by: FAMILY MEDICINE

## 2025-06-24 PROCEDURE — RXMED WILLOW AMBULATORY MEDICATION CHARGE

## 2025-06-24 PROCEDURE — 3074F SYST BP LT 130 MM HG: CPT | Performed by: FAMILY MEDICINE

## 2025-06-24 PROCEDURE — 3044F HG A1C LEVEL LT 7.0%: CPT | Performed by: FAMILY MEDICINE

## 2025-06-24 RX ORDER — AMLODIPINE BESYLATE 5 MG/1
5 TABLET ORAL
Qty: 90 TABLET | Refills: 3 | Status: SHIPPED | OUTPATIENT
Start: 2025-06-24 | End: 2026-06-24

## 2025-06-24 RX ORDER — LISINOPRIL AND HYDROCHLOROTHIAZIDE 20; 25 MG/1; MG/1
1 TABLET ORAL DAILY
Qty: 90 TABLET | Refills: 3 | Status: SHIPPED | OUTPATIENT
Start: 2025-06-24 | End: 2026-06-24

## 2025-06-24 RX ORDER — ELETRIPTAN HYDROBROMIDE 40 MG/1
TABLET, FILM COATED ORAL
Qty: 6 TABLET | Refills: 2 | Status: SHIPPED | OUTPATIENT
Start: 2025-06-24 | End: 2026-06-24

## 2025-06-24 RX ORDER — LOVASTATIN 40 MG/1
80 TABLET ORAL DAILY
Qty: 180 TABLET | Refills: 3 | Status: SHIPPED | OUTPATIENT
Start: 2025-06-24 | End: 2026-06-24

## 2025-06-24 NOTE — ASSESSMENT & PLAN NOTE
- new dx 6/4/2024  - continue Jardiance 10 mg PO daily, ordered HbA1C  - IO urine microalbumin ok 9/11/2024, continue Jardiance 10 mg PO daily, ordered repeat  - IO lipids reviewed from 9/11/2024 continue Lovastatin 80 mg PO daily. Ordered repeat  - Prevnar 20 given in office 6/4/2024  - IO foot exam without deficits 6/24/2025  - continue weight loss efforts  - follow up 3-6 mo, will need HbA1C

## 2025-06-24 NOTE — PROGRESS NOTES
Chief complaint:   Chief Complaint   Patient presents with    5 month follow up       HPI:  Debbi Brown is a 59 y.o. female who presents for evaluation of     For her migraines she takes Relpax on average once weekly or less. She previously was getting 2-3 weekly. They have improved.  She has lost 44 lbs in 2 years (weight watchers)    Physical exam:  /72 (BP Location: Left arm, Patient Position: Sitting)   Temp 35.6 °C (96.1 °F)   Wt 85.3 kg (188 lb)   BMI 33.30 kg/m²   General: NAD, well appearing female  Heart: RRR, no mumur appreciated  Lungs: CTAB, no wheezes, rales, rhonchi  Abdomen: soft, non tender, normoactive BS, no organomegaly  Extremities: No LE edema  Monofilament testing without deficits, positional sense normal, normal dorsalis pedis and posterior tibialis     Assessment/Plan   Problem List Items Addressed This Visit       Hyperlipidemia    - continue Lovastatin 80 mg PO QHS  - last lipids 9/11/2024, repeat ordered today         Relevant Medications    lovastatin (Mevacor) 40 mg tablet    Hypertension    - at goal  - continue weight loss efforts and regular exercise  - continue current medication therapy  - follow up 6 mo         Relevant Medications    lisinopriL-hydrochlorothiazide 20-25 mg tablet    amLODIPine (Norvasc) 5 mg tablet    Migraines    - continue Relpax for PRN use (on average use weekly)         Relevant Medications    eletriptan (Relpax) 40 mg tablet    Type 2 diabetes mellitus with proteinuria (Multi)    - new dx 6/4/2024  - continue Jardiance 10 mg PO daily, ordered HbA1C  - IO urine microalbumin ok 9/11/2024, continue Jardiance 10 mg PO daily, ordered repeat  - IO lipids reviewed from 9/11/2024 continue Lovastatin 80 mg PO daily. Ordered repeat  - Prevnar 20 given in office 6/4/2024  - IO foot exam without deficits 6/24/2025  - continue weight loss efforts  - follow up 3-6 mo, will need HbA1C         Relevant Medications    empagliflozin (Jardiance) 10 mg tablet     Other Relevant Orders    Comprehensive metabolic panel    Albumin-Creatinine Ratio, Urine Random    Hemoglobin A1c    Lipid panel     Other Visit Diagnoses         Type 2 diabetes mellitus without complication, without long-term current use of insulin    -  Primary    Relevant Medications    empagliflozin (Jardiance) 10 mg tablet      Encounter for screening mammogram for malignant neoplasm of breast        Relevant Orders    BI mammo bilateral screening tomosynthesis        Discussed Shingrix vaccination series, she will look into scheduling this  Mammogram ordered to be scheduled    Mckenna Santoyo DO

## 2025-06-25 LAB
ALBUMIN SERPL-MCNC: 4.6 G/DL (ref 3.6–5.1)
ALBUMIN/CREAT UR: 4 MG/G CREAT
ALP SERPL-CCNC: 71 U/L (ref 37–153)
ALT SERPL-CCNC: 11 U/L (ref 6–29)
ANION GAP SERPL CALCULATED.4IONS-SCNC: 9 MMOL/L (CALC) (ref 7–17)
AST SERPL-CCNC: 17 U/L (ref 10–35)
BILIRUB SERPL-MCNC: 0.7 MG/DL (ref 0.2–1.2)
BUN SERPL-MCNC: 17 MG/DL (ref 7–25)
CALCIUM SERPL-MCNC: 9 MG/DL (ref 8.6–10.4)
CHLORIDE SERPL-SCNC: 101 MMOL/L (ref 98–110)
CHOLEST SERPL-MCNC: 188 MG/DL
CHOLEST/HDLC SERPL: 2.8 (CALC)
CO2 SERPL-SCNC: 27 MMOL/L (ref 20–32)
CREAT SERPL-MCNC: 0.64 MG/DL (ref 0.5–1.03)
CREAT UR-MCNC: 100 MG/DL (ref 20–275)
EGFRCR SERPLBLD CKD-EPI 2021: 103 ML/MIN/1.73M2
EST. AVERAGE GLUCOSE BLD GHB EST-MCNC: 123 MG/DL
EST. AVERAGE GLUCOSE BLD GHB EST-SCNC: 6.8 MMOL/L
GLUCOSE SERPL-MCNC: 105 MG/DL (ref 65–99)
HBA1C MFR BLD: 5.9 %
HDLC SERPL-MCNC: 68 MG/DL
LDLC SERPL CALC-MCNC: 104 MG/DL (CALC)
MICROALBUMIN UR-MCNC: 0.4 MG/DL
NONHDLC SERPL-MCNC: 120 MG/DL (CALC)
POTASSIUM SERPL-SCNC: 4.5 MMOL/L (ref 3.5–5.3)
PROT SERPL-MCNC: 7.3 G/DL (ref 6.1–8.1)
SODIUM SERPL-SCNC: 137 MMOL/L (ref 135–146)
TRIGL SERPL-MCNC: 72 MG/DL

## 2025-06-26 ENCOUNTER — PHARMACY VISIT (OUTPATIENT)
Dept: PHARMACY | Facility: CLINIC | Age: 60
End: 2025-06-26
Payer: COMMERCIAL

## 2025-09-16 ENCOUNTER — APPOINTMENT (OUTPATIENT)
Dept: PRIMARY CARE | Facility: CLINIC | Age: 60
End: 2025-09-16
Payer: COMMERCIAL